# Patient Record
Sex: MALE | Race: WHITE | NOT HISPANIC OR LATINO | Employment: OTHER | URBAN - METROPOLITAN AREA
[De-identification: names, ages, dates, MRNs, and addresses within clinical notes are randomized per-mention and may not be internally consistent; named-entity substitution may affect disease eponyms.]

---

## 2018-02-21 PROBLEM — Z96.651 S/P TOTAL KNEE ARTHROPLASTY, RIGHT: Status: ACTIVE | Noted: 2018-02-21

## 2018-02-21 PROBLEM — M22.2X1 PATELLOFEMORAL SYNDROME OF RIGHT KNEE: Status: ACTIVE | Noted: 2018-02-21

## 2018-02-21 PROBLEM — T84.82XA ARTHROFIBROSIS OF TOTAL KNEE ARTHROPLASTY: Status: ACTIVE | Noted: 2018-02-21

## 2018-06-23 PROBLEM — E66.01 MORBID OBESITY: Status: ACTIVE | Noted: 2018-06-23

## 2018-06-23 PROBLEM — R55 SYNCOPAL EPISODES: Status: ACTIVE | Noted: 2018-06-23

## 2018-06-23 PROBLEM — N17.9 AKI (ACUTE KIDNEY INJURY): Status: ACTIVE | Noted: 2018-06-23

## 2018-06-23 PROBLEM — I10 ESSENTIAL HYPERTENSION: Status: ACTIVE | Noted: 2018-06-23

## 2018-06-23 PROBLEM — L03.115 CELLULITIS OF RIGHT LOWER EXTREMITY: Status: ACTIVE | Noted: 2018-06-23

## 2018-06-24 PROBLEM — L03.115 CELLULITIS OF KNEE, RIGHT: Status: ACTIVE | Noted: 2018-06-24

## 2018-06-24 PROBLEM — B35.3 TINEA PEDIS OF RIGHT FOOT: Status: ACTIVE | Noted: 2018-06-24

## 2018-06-25 PROBLEM — T84.59XA INFECTED PROSTHETIC KNEE JOINT, INITIAL ENCOUNTER: Status: ACTIVE | Noted: 2018-06-25

## 2018-06-25 PROBLEM — Z96.659 INFECTED PROSTHETIC KNEE JOINT, INITIAL ENCOUNTER: Status: ACTIVE | Noted: 2018-06-25

## 2018-06-28 PROBLEM — Z02.9 DISCHARGE PLANNING ISSUES: Status: ACTIVE | Noted: 2018-06-28

## 2018-06-28 PROBLEM — Z75.8 DISCHARGE PLANNING ISSUES: Status: ACTIVE | Noted: 2018-06-28

## 2018-07-05 PROBLEM — E87.8 ELECTROLYTE ABNORMALITY: Status: ACTIVE | Noted: 2018-07-05

## 2018-07-06 DIAGNOSIS — C41.9 MALIGNANT NEOPLASM OF BONE, UNSPECIFIED LOCATION: Primary | ICD-10-CM

## 2018-08-02 ENCOUNTER — TELEPHONE (OUTPATIENT)
Dept: HEMATOLOGY/ONCOLOGY | Facility: CLINIC | Age: 73
End: 2018-08-02

## 2018-08-02 NOTE — TELEPHONE ENCOUNTER
[facility] for patient to see Dr. Fonseca.  Available time was based on transportation via [facility].  Marley verbalized understanding.

## 2018-08-25 PROBLEM — N12 PYELONEPHRITIS: Status: ACTIVE | Noted: 2018-08-25

## 2018-08-25 PROBLEM — R41.0 DELIRIUM: Status: ACTIVE | Noted: 2018-08-25

## 2018-08-25 PROBLEM — D63.8 ANEMIA, CHRONIC DISEASE: Status: ACTIVE | Noted: 2018-08-25

## 2018-08-25 PROBLEM — E86.1 INTRAVASCULAR VOLUME DEPLETION: Status: ACTIVE | Noted: 2018-08-25

## 2018-08-26 PROBLEM — A41.9 SEPSIS: Status: ACTIVE | Noted: 2018-08-26

## 2019-01-05 PROBLEM — T84.53XA INFECTION OF TOTAL RIGHT KNEE REPLACEMENT: Status: ACTIVE | Noted: 2019-01-05

## 2019-09-06 ENCOUNTER — TELEPHONE (OUTPATIENT)
Dept: NEUROSURGERY | Facility: CLINIC | Age: 74
End: 2019-09-06

## 2019-09-25 ENCOUNTER — OFFICE VISIT (OUTPATIENT)
Dept: NEUROSURGERY | Facility: CLINIC | Age: 74
End: 2019-09-25
Payer: MEDICARE

## 2019-09-25 VITALS — SYSTOLIC BLOOD PRESSURE: 120 MMHG | DIASTOLIC BLOOD PRESSURE: 80 MMHG | HEART RATE: 64 BPM

## 2019-09-25 DIAGNOSIS — G89.4 CHRONIC PAIN DISORDER: Primary | ICD-10-CM

## 2019-09-25 PROCEDURE — 3079F PR MOST RECENT DIASTOLIC BLOOD PRESSURE 80-89 MM HG: ICD-10-PCS | Mod: CPTII,S$GLB,, | Performed by: PHYSICIAN ASSISTANT

## 2019-09-25 PROCEDURE — 99999 PR PBB SHADOW E&M-EST. PATIENT-LVL III: CPT | Mod: PBBFAC,,, | Performed by: PHYSICIAN ASSISTANT

## 2019-09-25 PROCEDURE — 1101F PR PT FALLS ASSESS DOC 0-1 FALLS W/OUT INJ PAST YR: ICD-10-PCS | Mod: CPTII,S$GLB,, | Performed by: PHYSICIAN ASSISTANT

## 2019-09-25 PROCEDURE — 99203 PR OFFICE/OUTPT VISIT, NEW, LEVL III, 30-44 MIN: ICD-10-PCS | Mod: S$GLB,,, | Performed by: PHYSICIAN ASSISTANT

## 2019-09-25 PROCEDURE — 3079F DIAST BP 80-89 MM HG: CPT | Mod: CPTII,S$GLB,, | Performed by: PHYSICIAN ASSISTANT

## 2019-09-25 PROCEDURE — 1101F PT FALLS ASSESS-DOCD LE1/YR: CPT | Mod: CPTII,S$GLB,, | Performed by: PHYSICIAN ASSISTANT

## 2019-09-25 PROCEDURE — 3074F SYST BP LT 130 MM HG: CPT | Mod: CPTII,S$GLB,, | Performed by: PHYSICIAN ASSISTANT

## 2019-09-25 PROCEDURE — 99203 OFFICE O/P NEW LOW 30 MIN: CPT | Mod: S$GLB,,, | Performed by: PHYSICIAN ASSISTANT

## 2019-09-25 PROCEDURE — 99999 PR PBB SHADOW E&M-EST. PATIENT-LVL III: ICD-10-PCS | Mod: PBBFAC,,, | Performed by: PHYSICIAN ASSISTANT

## 2019-09-25 PROCEDURE — 3074F PR MOST RECENT SYSTOLIC BLOOD PRESSURE < 130 MM HG: ICD-10-PCS | Mod: CPTII,S$GLB,, | Performed by: PHYSICIAN ASSISTANT

## 2019-09-25 NOTE — LETTER
October 8, 2019      Dillon Peacock MD  [id number] [facility] [address]  [facility] [address]           [place] [facility]  [address] [place] [address]  Phone: [phone]  Fax: [phone]          Patient: Edmundo Beyer   MR Number: [id number]   YOB: 1945   Date of Visit: 9/25/2019       Dear Dr. Dillon Peacock:    Thank you for referring Edmundo Beyer to me for evaluation. Attached you will find relevant portions of my assessment and plan of care.    If you have questions, please do not hesitate to call me. I look forward to following Edmundo Beyer along with you.    Sincerely,    Leila Isbell PA-C    Enclosure  CC:  No Recipients    If you would like to receive this communication electronically, please contact [email or web address] or [phone] to request more information on [employer] Link access.    For providers and/or their staff who would like to refer a patient to [facility], please contact us through our one-stop-shop provider referral line, [facility], at [phone].    If you feel you have received this communication in error or would no longer like to receive these types of communications, please e-mail [email or web address]

## 2019-09-25 NOTE — PROGRESS NOTES
[facility] - Neurosurgery  Clinic Consult     Consult Requested By: Dillon Peacock MD  PCP: Landon Shea MD    Chief Complaint:   Chief Complaint   Patient presents with    Lumbar Spine Pain (L-Spine)     patient reports right buttocks pain that radiates into the right leg; pain 7/10         Past Medical History:   Diagnosis Date    Arthritis     Cancer 2012    left leg amputated, bladder     Encounter for blood transfusion     Hypertension     Presence of urostomy     Sleep apnea with use of continuous positive airway pressure (CPAP)     Wheelchair dependent      Past Surgical History:   Procedure Laterality Date    BACK SURGERY      4 surgeries    CARDIOVERSION      x 2    CARPAL TUNNEL RELEASE Bilateral     COLON SURGERY      COLONOSCOPY  2014    COLOSTOMY      COLOSTOMY CLOSURE      JOINT REPLACEMENT Right 2007    TKA    JOINT REPLACEMENT Right 02/21/2018    Spacer exchange TKA/lateral release/Exc bony overgrowth patella    JOINT REPLACEMENT Right 01/05/2019    Stage 2 revision, all 3 components/Open lateral release.    LEG AMPUTATION AT HIP Left 09/20/2012    MEDIPORT REMOVAL Left 8/30/2018    Procedure: REMOVAL, CATHETER, CENTRAL VENOUS, TUNNELED, WITH PORT;  Surgeon: Stu Godinez MD;  Location: [facility] OR;  Service: General;  Laterality: Left;    NECK SURGERY      2 surgeries    PORTACATH PLACEMENT      REVISION OF KNEE ARTHROPLASTY Right 6/26/2018    Procedure: REVISION, ARTHROPLASTY, KNEE-stage one;  Surgeon: Dillon Peacock MD;  Location: [facility] OR;  Service: Orthopedics;  Laterality: Right;    REVISION OF KNEE ARTHROPLASTY Right 1/5/2019    Procedure: REVISION, ARTHROPLASTY, KNEE STAGE 2-OPEN LATERAL RELEASE, WITH REMOVAL OF IMPLANTS.;  Surgeon: Dillon Peacock MD;  Location: [facility] OR;  Service: Orthopedics;  Laterality: Right;    TOTAL KNEE ARTHROPLASTY Right      Family History   Adopted: Yes     Social History     Tobacco Use    Smoking status: Former Smoker      Packs/day: 1.00     Types: Cigarettes     Last attempt to quit: 2000     Years since quittin.7    Smokeless tobacco: Former User    Tobacco comment: quit 18 years ago    Substance Use Topics    Alcohol use: No     Frequency: Never    Drug use: No      Review of patient's allergies indicates:   Allergen Reactions    Hydromorphone Itching    Morphine Itching       Current Outpatient Medications:     amlodipine (NORVASC) 10 MG tablet, Take 10 mg by mouth once daily. Take am of surgery with a sip of water, Disp: , Rfl:     amoxicillin-clavulanate 500-125mg (AUGMENTIN) 500-125 mg Tab, Take 1 tablet by mouth 2 (two) times daily., Disp: , Rfl: 0    escitalopram oxalate (LEXAPRO) 10 MG tablet, Take 10 mg by mouth once daily. Take am of surgery with a sip of water, Disp: , Rfl:     fentaNYL (DURAGESIC) 50 mcg/hr, Place 1 patch onto the skin every 72 hours., Disp: 10 patch, Rfl: 0    ferrous gluconate (FERGON) 324 MG tablet, Take 1 tablet (324 mg total) by mouth daily with breakfast. (Patient taking differently: Take 324 mg by mouth daily with breakfast. Hold am of surgery), Disp: , Rfl:     furosemide (LASIX) 40 MG tablet, , Disp: , Rfl:     metoprolol tartrate (LOPRESSOR) 50 MG tablet, Take 50 mg by mouth 2 (two) times daily. Take am of surgery with a sip of water, Disp: , Rfl:     oxyCODONE-acetaminophen (PERCOCET)  mg per tablet, Take 1 tablet by mouth every 8 (eight) hours as needed for Pain., Disp: 90 tablet, Rfl: 0    oxyCODONE-acetaminophen (PERCOCET)  mg per tablet, Take 1 tablet by mouth every 8 (eight) hours as needed., Disp: 90 tablet, Rfl: 0    pantoprazole (PROTONIX) 40 MG tablet, Take 40 mg by mouth once daily. Take am of surgery with a sip of water, Disp: , Rfl:     rosuvastatin (CRESTOR) 10 MG tablet, , Disp: , Rfl:     Review of Systems:   Constitutional: no fever, chills or night sweats. No changes in weight   Eyes: no visual changes   ENT: no nasal congestion or sore  throat   Respiratory: no cough or shortness of breath   Cardiovascular: no chest pain or palpitations   Gastrointestinal: no nausea or vomiting   Genitourinary: no hematuria or dysuria   Musculoskeletal: +myalgias, back pain   Neurological: no seizures or tremors +numbness  Behavioral/Psych: no auditory or visual hallucinations     OBJECTIVE:     Vital Signs (Most Recent):  Vitals:    09/25/19 1434   BP: 120/80   Pulse: 64         Physical Exam:   General: well developed, well nourished, no distress.   Neurologic: Alert and oriented. Thought content appropriate. GCS 15.   Head: normocephalic, atraumatic  Eyes: EOMI  Neck: trachea midline, no JVD   Cardiovascular: no LE edema  Pulmonary: normal respirations, no signs of respiratory distress  Skin: Skin is warm, dry and intact.  Motor Strength: moves upper extremities spontaneously with good strength and tone. 5/5 right leg throughout. Left leg amputation   DTR right leg 0  Gait: deferred    SILT RLE       Provider Dictation:     Edmundo Beyer is a 74 y.o. male who presents for evaluation of low back pain and right leg pain. Patient reports history of chronic neck and back pain. He reports history of cervical fusion and lumbar decompression and fusion. He states he was diagnosed with bladder cancer approximately 7 years ago. He states the cancer spread into the left pelvis which required left hemipelvectomy and left lower extremity amputation. He reports part of his right leg was removed to reconstruct the left pelvis in order to allow him to sit. He states he is wheelchair bound. He is able to transfer himself from the wheelchair to his bed. He reports chronic low back pain with pain into bilateral buttocks. He reports intermittent right leg numbness. He complains of phantom limb pain. He has seen many pain management doctors in the past. He is interested in establishing care with a new pain management physician now.     No imaging available at time of appointment      On exam, patient is sitting in wheelchair. He is full strength in right leg. Sensation intact. Left leg amputation.     VAS 7/10  PHQ 17  Oswestry Disability Index 66    I have discussed obtaining new imaging of the lumbar spine for further work up. We have discussed referral to pain management to discuss conservative therapies for his pain. Patient states he is only interested in seeing pain management to discuss pain control. I have referred to pain management at the request of the patient.     Patient verbalized understanding of plan. Encouraged to call with any questions or concerns.     Chronic pain disorder  -     Ambulatory Referral to Pain Clinic

## 2019-09-30 ENCOUNTER — TELEPHONE (OUTPATIENT)
Dept: PAIN MEDICINE | Facility: CLINIC | Age: 74
End: 2019-09-30

## 2019-09-30 ENCOUNTER — OFFICE VISIT (OUTPATIENT)
Dept: PAIN MEDICINE | Facility: CLINIC | Age: 74
End: 2019-09-30
Payer: MEDICARE

## 2019-09-30 ENCOUNTER — HOSPITAL ENCOUNTER (OUTPATIENT)
Dept: RADIOLOGY | Facility: HOSPITAL | Age: 74
Discharge: HOME | End: 2019-09-30
Attending: ANESTHESIOLOGY
Payer: MEDICARE

## 2019-09-30 VITALS
HEART RATE: 68 BPM | OXYGEN SATURATION: 95 % | SYSTOLIC BLOOD PRESSURE: 137 MMHG | DIASTOLIC BLOOD PRESSURE: 63 MMHG | TEMPERATURE: 98 F | RESPIRATION RATE: 20 BRPM

## 2019-09-30 DIAGNOSIS — G89.4 CHRONIC PAIN SYNDROME: ICD-10-CM

## 2019-09-30 DIAGNOSIS — G54.6 PHANTOM LIMB PAIN: ICD-10-CM

## 2019-09-30 DIAGNOSIS — M54.16 RIGHT LUMBAR RADICULOPATHY: ICD-10-CM

## 2019-09-30 DIAGNOSIS — M96.1 POSTLAMINECTOMY SYNDROME OF LUMBAR REGION: ICD-10-CM

## 2019-09-30 DIAGNOSIS — M96.1 POSTLAMINECTOMY SYNDROME OF LUMBAR REGION: Primary | ICD-10-CM

## 2019-09-30 DIAGNOSIS — Z79.891 OPIOID USE AGREEMENT EXISTS: ICD-10-CM

## 2019-09-30 PROCEDURE — 99999 PR PBB SHADOW E&M-EST. PATIENT-LVL III: CPT | Mod: PBBFAC,,, | Performed by: ANESTHESIOLOGY

## 2019-09-30 PROCEDURE — 3075F PR MOST RECENT SYSTOLIC BLOOD PRESS GE 130-139MM HG: ICD-10-PCS | Mod: CPTII,S$GLB,, | Performed by: ANESTHESIOLOGY

## 2019-09-30 PROCEDURE — 3075F SYST BP GE 130 - 139MM HG: CPT | Mod: CPTII,S$GLB,, | Performed by: ANESTHESIOLOGY

## 2019-09-30 PROCEDURE — 99204 OFFICE O/P NEW MOD 45 MIN: CPT | Mod: S$GLB,,, | Performed by: ANESTHESIOLOGY

## 2019-09-30 PROCEDURE — 72080 XR THORACOLUMBAR SPINE AP LATERAL: ICD-10-PCS | Mod: 26,,, | Performed by: RADIOLOGY

## 2019-09-30 PROCEDURE — 1101F PR PT FALLS ASSESS DOC 0-1 FALLS W/OUT INJ PAST YR: ICD-10-PCS | Mod: CPTII,S$GLB,, | Performed by: ANESTHESIOLOGY

## 2019-09-30 PROCEDURE — 1101F PT FALLS ASSESS-DOCD LE1/YR: CPT | Mod: CPTII,S$GLB,, | Performed by: ANESTHESIOLOGY

## 2019-09-30 PROCEDURE — 3078F PR MOST RECENT DIASTOLIC BLOOD PRESSURE < 80 MM HG: ICD-10-PCS | Mod: CPTII,S$GLB,, | Performed by: ANESTHESIOLOGY

## 2019-09-30 PROCEDURE — 72080 X-RAY EXAM THORACOLMB 2/> VW: CPT | Mod: TC,PO

## 2019-09-30 PROCEDURE — 99999 PR PBB SHADOW E&M-EST. PATIENT-LVL III: ICD-10-PCS | Mod: PBBFAC,,, | Performed by: ANESTHESIOLOGY

## 2019-09-30 PROCEDURE — 99204 PR OFFICE/OUTPT VISIT, NEW, LEVL IV, 45-59 MIN: ICD-10-PCS | Mod: S$GLB,,, | Performed by: ANESTHESIOLOGY

## 2019-09-30 PROCEDURE — 80307 DRUG TEST PRSMV CHEM ANLYZR: CPT

## 2019-09-30 PROCEDURE — 72080 X-RAY EXAM THORACOLMB 2/> VW: CPT | Mod: 26,,, | Performed by: RADIOLOGY

## 2019-09-30 PROCEDURE — 3078F DIAST BP <80 MM HG: CPT | Mod: CPTII,S$GLB,, | Performed by: ANESTHESIOLOGY

## 2019-09-30 RX ORDER — PREGABALIN 50 MG/1
50 CAPSULE ORAL 2 TIMES DAILY
Qty: 60 CAPSULE | Refills: 2 | Status: SHIPPED | OUTPATIENT
Start: 2019-09-30 | End: 2019-10-28

## 2019-09-30 RX ORDER — FENTANYL 50 UG/1
1 PATCH TRANSDERMAL
Qty: 10 PATCH | Refills: 0 | Status: SHIPPED | OUTPATIENT
Start: 2019-09-30 | End: 2019-10-28 | Stop reason: SDUPTHER

## 2019-09-30 RX ORDER — OXYCODONE AND ACETAMINOPHEN 10; 325 MG/1; MG/1
1 TABLET ORAL EVERY 8 HOURS PRN
Qty: 90 TABLET | Refills: 0 | Status: SHIPPED | OUTPATIENT
Start: 2019-09-30 | End: 2019-10-28 | Stop reason: SDUPTHER

## 2019-09-30 NOTE — TELEPHONE ENCOUNTER
There is a block on your license and the pharmacy is not able to fill medications. They recommended you contact the [facility] to get cleared. C2 medications are the only medications that can be filled at this time.

## 2019-09-30 NOTE — LETTER
October 6, 2019      Leila Isbell PA-C  [address]  [address]  [place] [address]           [place] - [facility]  [address] [place] [address]  Phone: [phone]  Fax: [phone]          Patient: Edmundo Beyer   MR Number: [id number]   YOB: 1945   Date of Visit: 9/30/2019       Dear Leila Isbell:    Thank you for referring Edmundo Beyer to me for evaluation. Attached you will find relevant portions of my assessment and plan of care.    If you have questions, please do not hesitate to call me. I look forward to following Edmundo Beyer along with you.    Sincerely,    Felipe Renae MD    Enclosure  CC:  No Recipients    If you would like to receive this communication electronically, please contact [email or web address] or [phone] to request more information on [employer] Link access.    For providers and/or their staff who would like to refer a patient to [facility], please contact us through our one-stop-shop provider referral line, [facility] , at [phone].    If you feel you have received this communication in error or would no longer like to receive these types of communications, please e-mail [email or web address]

## 2019-09-30 NOTE — PROGRESS NOTES
This note was completed with dictation software and grammatical errors may exist.    CC:  Back pain, right leg pain, neck pain    HPI:  The patient is a 74-year-old man with a history of bladder CA status post cystectomy also requiring left leg amputation, chronic low back pain with multiple lumbar surgeries who presents in referral from [facility] for continued pain. The patient has had a total of 4 back surgeries with fusions, laminectomy is, continued right leg radicular pain throughout the entire extremity and foot.  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  In the past year he had a revision of his right knee replacement but had an infection and required a spacer being placed, long-term antibiotics.  He has been recovering from this and has consulted with Neurosurgery about his back and leg pain and they do not feel that he is a surgical candidate at this point.  His pain is located across the bilateral buttocks and throughout the entire right leg.  He describes it as aching, burning, grabbing, sharp and electric.  It is worse with prolonged sitting, standing, lying down or getting out of a bed or a chair.  He does get some relief with medications. He has been taking Percocet 10/325 up to 3 times a day with about 75% relief in addition to fentanyl 50 mcg/hour.  He denies any new fevers or chills, no new weakness.    Pain intervention history:  He had tried multiple injections in the past, the last time was over 10 years ago.  He has tried gabapentin in the past but had side effects.  He has not tried Lyrica or Topamax(has a history of renal calculi).He has been taking Percocet 10/325 up to 3 times a day with about 75% relief in addition to fentanyl 50 mcg/hour.    Lab Results   Component Value Date    HGBA1C 5.5 01/03/2019         ROS:  He reports fatigability, weight gain, itching, shortness of breath, constipation, easy bruising, bleeding, history of kidney  stones, joint stiffness, joint swelling, back pain, difficulty sleeping, depression and loss of balance.  Balance of review of systems is negative.    Past Medical History:   Diagnosis Date    Arthritis     Cancer 2012    left leg amputated, bladder     Encounter for blood transfusion     Hypertension     Presence of urostomy     Sleep apnea with use of continuous positive airway pressure (CPAP)     Wheelchair dependent        Past Surgical History:   Procedure Laterality Date    BACK SURGERY      4 surgeries    CARDIOVERSION      x 2    CARPAL TUNNEL RELEASE Bilateral     COLON SURGERY      COLONOSCOPY  2014    COLOSTOMY      COLOSTOMY CLOSURE      JOINT REPLACEMENT Right 2007    TKA    JOINT REPLACEMENT Right 02/21/2018    Spacer exchange TKA/lateral release/Exc bony overgrowth patella    JOINT REPLACEMENT Right 01/05/2019    Stage 2 revision, all 3 components/Open lateral release.    LEG AMPUTATION AT HIP Left 09/20/2012    MEDIPORT REMOVAL Left 8/30/2018    Procedure: REMOVAL, CATHETER, CENTRAL VENOUS, TUNNELED, WITH PORT;  Surgeon: Stu Godinez MD;  Location: [facility] OR;  Service: General;  Laterality: Left;    NECK SURGERY      2 surgeries    PORTACATH PLACEMENT      REVISION OF KNEE ARTHROPLASTY Right 6/26/2018    Procedure: REVISION, ARTHROPLASTY, KNEE-stage one;  Surgeon: Dillon Peacock MD;  Location: [facility] OR;  Service: Orthopedics;  Laterality: Right;    REVISION OF KNEE ARTHROPLASTY Right 1/5/2019    Procedure: REVISION, ARTHROPLASTY, KNEE STAGE 2-OPEN LATERAL RELEASE, WITH REMOVAL OF IMPLANTS.;  Surgeon: Dillon Peacock MD;  Location: [facility] OR;  Service: Orthopedics;  Laterality: Right;    TOTAL KNEE ARTHROPLASTY Right        Social History     Socioeconomic History    Marital status:      Spouse name: Not on file    Number of children: Not on file    Years of education: Not on file    Highest education level: Not on file   Occupational History    Not on file   Social  Needs    Financial resource strain: Not on file    Food insecurity:     Worry: Not on file     Inability: Not on file    Transportation needs:     Medical: Not on file     Non-medical: Not on file   Tobacco Use    Smoking status: Former Smoker     Packs/day: 1.00     Types: Cigarettes     Last attempt to quit: 2000     Years since quittin.7    Smokeless tobacco: Former User    Tobacco comment: quit 18 years ago    Substance and Sexual Activity    Alcohol use: No     Frequency: Never    Drug use: No    Sexual activity: Never   Lifestyle    Physical activity:     Days per week: Not on file     Minutes per session: Not on file    Stress: Not on file   Relationships    Social connections:     Talks on phone: Not on file     Gets together: Not on file     Attends Baptism service: Not on file     Active member of club or organization: Not on file     Attends meetings of clubs or organizations: Not on file     Relationship status: Not on file   Other Topics Concern    Not on file   Social History Narrative    Not on file         Medications/Allergies: See med card    Vitals:    19 0816   BP: 137/63   Pulse: 68   Resp: 20   Temp: 97.8 °F (36.6 °C)   TempSrc: Oral   SpO2: 95%   PainSc:   6   PainLoc: Back         Physical exam:  Gen: A and O x3, pleasant, well-groomed  Skin: No rashes or obvious lesions  HEENT: PERRLA, no obvious deformities on ears or in canals. Trachea midline.  CVS: Regular rate and rhythm, normal palpable pulses.  Resp:No increased work of breathing, symmetrical chest rise.  Abdomen: Soft, NT/ND.  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    Neuro:  Lower extremities: 5/5 strength right side  Reflexes: Patellar 0+, Achilles 0+ right side  Sensory:  Right leg: Intact and symmetrical to light touch and pinprick in L2-S1 dermatomes with some decreased sensation to light touch and pinprick throughout the entire leg.    Lumbar spine:  Lumbar spine:   Range of motion not tested, unable to stand without assistance.  Ch[place]'s test causes no increased pain on either side.    Supine straight leg raise is negative on the right side.  Internal and external rotation of the hip causes no increased pain on either side.  Myofascial exam: No tenderness to palpation across lumbar paraspinous muscles.    Imaging:  None    Assessment:   The patient is a 74-year-old man with a history of bladder CA status post cystectomy also requiring left leg amputation, chronic low back pain with multiple lumbar surgeries who presents in referral from [facility] for continued pain.    1. Postlaminectomy syndrome of lumbar region  Pain Clinic Drug Screen    X-Ray Thoracolumbar Spine AP Lateral   2. Right lumbar radiculopathy  Pain Clinic Drug Screen    X-Ray Thoracolumbar Spine AP Lateral   3. Phantom limb pain  Pain Clinic Drug Screen    X-Ray Thoracolumbar Spine AP Lateral   4. Chronic pain syndrome  Pain Clinic Drug Screen    X-Ray Thoracolumbar Spine AP Lateral   5. Opioid use agreement exists  Pain Clinic Drug Screen       Plan:  1.  We discussed that he is not a great candidate for any further surgeries, may not even be a great candidate for any other procedural interventions.  At this point his pain is moderately controlled with fentanyl patch at 50 mcg/hour in addition to Percocet 3 times a day and I think that would be reasonable.  It does not appear that he has had any early refills or behavior that would suggest abuse of his medications. I have agreed to take over this medication for him, I have given him a refill of the fentanyl patch at 50 mcg/hour in addition to Percocet 10/325.  I am going to have him sign an opioid agreement and complete a urine drug screen.  2.  We are going to get imaging of his lumbar spine in the form of x-rays, we can discuss further management in follow-up visit.  3.  In the meantime I am going to give him prescription for Lyrica 50 mg  to start nightly and then increase to twice daily after week to see if this helps with his right leg radicular pain.    Thank you for referring this interesting patient, and I look forward to continuing to collaborate in his care.

## 2019-10-03 LAB
6MAM UR QL: NOT DETECTED
7AMINOCLONAZEPAM UR QL: NOT DETECTED
A-OH ALPRAZ UR QL: NOT DETECTED
ALPRAZ UR QL: NOT DETECTED
AMPHET UR QL SCN: NOT DETECTED
BARBITURATES UR QL: NOT DETECTED
BUPRENORPHINE UR QL: NOT DETECTED
BZE UR QL: NOT DETECTED
CARBOXYTHC UR QL: PRESENT
CARISOPRODOL UR QL: NOT DETECTED
CLONAZEPAM UR QL: NOT DETECTED
CODEINE UR QL: NOT DETECTED
CREAT UR-MCNC: 68.4 MG/DL (ref 20–400)
DIAZEPAM UR QL: NOT DETECTED
ETHYL GLUCURONIDE UR QL: NOT DETECTED
FENTANYL UR QL: PRESENT
HYDROCODONE UR QL: NOT DETECTED
HYDROMORPHONE UR QL: NOT DETECTED
LORAZEPAM UR QL: NOT DETECTED
MDA UR QL: NOT DETECTED
MDEA UR QL: NOT DETECTED
MDMA UR QL: NOT DETECTED
ME-PHENIDATE UR QL: NOT DETECTED
MEPERIDINE UR QL: NOT DETECTED
METHADONE UR QL: NOT DETECTED
METHAMPHET UR QL: NOT DETECTED
MIDAZOLAM UR QL SCN: NOT DETECTED
MORPHINE UR QL: NOT DETECTED
NORBUPRENORPHINE UR QL CFM: NOT DETECTED
NORDIAZEPAM UR QL: NOT DETECTED
NORFENTANYL UR QL: PRESENT
NORHYDROCODONE UR QL CFM: NOT DETECTED
NOROXYCODONE UR QL CFM: PRESENT
NOROXYMORPHONE: NOT DETECTED
OXAZEPAM UR QL: NOT DETECTED
OXYCODONE UR QL: NOT DETECTED
OXYMORPHONE UR QL: NOT DETECTED
PATHOLOGY STUDY: NORMAL
PCP UR QL: NOT DETECTED
PHENTERMINE UR QL: NOT DETECTED
PROPOXYPH UR QL: NOT DETECTED
SERVICE CMNT-IMP: NORMAL
TAPENTADOL UR QL SCN: NOT DETECTED
TAPENTADOL-O-SULF: NOT DETECTED
TEMAZEPAM UR QL: NOT DETECTED
TRAMADOL UR QL: NOT DETECTED
ZOLPIDEM UR QL: NOT DETECTED

## 2019-10-08 ENCOUNTER — TELEPHONE (OUTPATIENT)
Dept: PAIN MEDICINE | Facility: CLINIC | Age: 74
End: 2019-10-08

## 2019-10-08 NOTE — TELEPHONE ENCOUNTER
Urine drug screen on 09/30/2019 positive for oxycodone metabolite, positive for Fentanyl and metabolite both of these were expected.  However it was also positive for marijuana metabolites.

## 2019-10-09 NOTE — TELEPHONE ENCOUNTER
Spoke with the patient and he is aware of the results. He is also aware that if he test positive again he may be dismissed from the practice as this goes against his contract with Dr. Rneae.

## 2019-10-28 ENCOUNTER — OFFICE VISIT (OUTPATIENT)
Dept: PAIN MEDICINE | Facility: CLINIC | Age: 74
End: 2019-10-28
Payer: MEDICARE

## 2019-10-28 VITALS
OXYGEN SATURATION: 95 % | HEART RATE: 69 BPM | TEMPERATURE: 97 F | RESPIRATION RATE: 20 BRPM | SYSTOLIC BLOOD PRESSURE: 161 MMHG | DIASTOLIC BLOOD PRESSURE: 73 MMHG

## 2019-10-28 DIAGNOSIS — N20.0 RENAL CALCULUS: ICD-10-CM

## 2019-10-28 DIAGNOSIS — G89.4 CHRONIC PAIN SYNDROME: ICD-10-CM

## 2019-10-28 DIAGNOSIS — G54.6 PHANTOM LIMB PAIN: ICD-10-CM

## 2019-10-28 DIAGNOSIS — M54.16 RIGHT LUMBAR RADICULOPATHY: ICD-10-CM

## 2019-10-28 DIAGNOSIS — M96.1 POSTLAMINECTOMY SYNDROME OF LUMBAR REGION: Primary | ICD-10-CM

## 2019-10-28 DIAGNOSIS — Z79.891 OPIOID USE AGREEMENT EXISTS: ICD-10-CM

## 2019-10-28 PROCEDURE — 3078F DIAST BP <80 MM HG: CPT | Mod: CPTII,S$GLB,, | Performed by: ANESTHESIOLOGY

## 2019-10-28 PROCEDURE — 3077F SYST BP >= 140 MM HG: CPT | Mod: CPTII,S$GLB,, | Performed by: ANESTHESIOLOGY

## 2019-10-28 PROCEDURE — 99999 PR PBB SHADOW E&M-EST. PATIENT-LVL III: ICD-10-PCS | Mod: PBBFAC,,, | Performed by: ANESTHESIOLOGY

## 2019-10-28 PROCEDURE — 99213 PR OFFICE/OUTPT VISIT, EST, LEVL III, 20-29 MIN: ICD-10-PCS | Mod: S$GLB,,, | Performed by: ANESTHESIOLOGY

## 2019-10-28 PROCEDURE — 99213 OFFICE O/P EST LOW 20 MIN: CPT | Mod: S$GLB,,, | Performed by: ANESTHESIOLOGY

## 2019-10-28 PROCEDURE — 3077F PR MOST RECENT SYSTOLIC BLOOD PRESSURE >= 140 MM HG: ICD-10-PCS | Mod: CPTII,S$GLB,, | Performed by: ANESTHESIOLOGY

## 2019-10-28 PROCEDURE — 3078F PR MOST RECENT DIASTOLIC BLOOD PRESSURE < 80 MM HG: ICD-10-PCS | Mod: CPTII,S$GLB,, | Performed by: ANESTHESIOLOGY

## 2019-10-28 PROCEDURE — 99999 PR PBB SHADOW E&M-EST. PATIENT-LVL III: CPT | Mod: PBBFAC,,, | Performed by: ANESTHESIOLOGY

## 2019-10-28 PROCEDURE — 1101F PT FALLS ASSESS-DOCD LE1/YR: CPT | Mod: CPTII,S$GLB,, | Performed by: ANESTHESIOLOGY

## 2019-10-28 PROCEDURE — 1101F PR PT FALLS ASSESS DOC 0-1 FALLS W/OUT INJ PAST YR: ICD-10-PCS | Mod: CPTII,S$GLB,, | Performed by: ANESTHESIOLOGY

## 2019-10-28 RX ORDER — FENTANYL 50 UG/1
1 PATCH TRANSDERMAL
Qty: 10 PATCH | Refills: 0 | Status: SHIPPED | OUTPATIENT
Start: 2019-11-29 | End: 2019-12-30 | Stop reason: SDUPTHER

## 2019-10-28 RX ORDER — OXYCODONE AND ACETAMINOPHEN 10; 325 MG/1; MG/1
1 TABLET ORAL EVERY 8 HOURS PRN
Qty: 90 TABLET | Refills: 0 | Status: SHIPPED | OUTPATIENT
Start: 2019-11-29 | End: 2019-12-30 | Stop reason: SDUPTHER

## 2019-10-28 RX ORDER — OXYCODONE AND ACETAMINOPHEN 10; 325 MG/1; MG/1
1 TABLET ORAL EVERY 8 HOURS PRN
Qty: 90 TABLET | Refills: 0 | Status: SHIPPED | OUTPATIENT
Start: 2019-10-30 | End: 2019-11-29

## 2019-10-28 RX ORDER — FENTANYL 50 UG/1
1 PATCH TRANSDERMAL
Qty: 10 PATCH | Refills: 0 | Status: SHIPPED | OUTPATIENT
Start: 2019-10-30 | End: 2019-11-29

## 2019-10-28 NOTE — PROGRESS NOTES
This note was completed with dictation software and grammatical errors may exist.    CC:  Back pain, right leg pain, neck pain    HPI:  The patient is a 74-year-old man with a history of bladder CA status post cystectomy also requiring left leg amputation, chronic low back pain with multiple lumbar surgeries who presents in referral from [facility] for continued pain. The patient returns in follow-up today, during the last visit I had agreed to take over writing his prescriptions.  He has been using fentanyl 50 mcg/hour and Percocet 10/325 up to 3 times a day.  He states that this regimen has done well to control his pain, denies any side effects with this.  Since I had last seen him, we had done a urine drug screen and it tested positive for THC.  He states that he used a CBD oil for 10 days that his friend had recommended and he states that it did not help so he will no longer be using this.  He had tried using the Lyrica 50 mg nightly for a week and states that he has had jerking of his extremities which is the same thing that happened with gabapentin but not as severe.    Also since we had last seen him, he developed a renal calculus.  He states that he has seen Urology and they were not able to do the lithotripsy treatment because the stone was so large and also because he has had a nonfunctioning bladder and surgical changes to his ureter anatomy.  He states that they are going to wait until it becomes painful and do a procedure through his flank.  At this point he does not feel any symptoms of the kidney stone however.      Previous history:  The patient has had a total of 4 back surgeries with fusions, laminectomy is, continued right leg radicular pain throughout the entire extremity and foot.  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  In the past year he had a revision of his right knee replacement but had an infection and required a spacer being placed,  long-term antibiotics.  He has been recovering from this and has consulted with Neurosurgery about his back and leg pain and they do not feel that he is a surgical candidate at this point.  His pain is located across the bilateral buttocks and throughout the entire right leg.  He describes it as aching, burning, grabbing, sharp and electric.  It is worse with prolonged sitting, standing, lying down or getting out of a bed or a chair.  He does get some relief with medications. He has been taking Percocet 10/325 up to 3 times a day with about 75% relief in addition to fentanyl 50 mcg/hour.  He denies any new fevers or chills, no new weakness.    Pain intervention history:  He had tried multiple injections in the past, the last time was over 10 years ago.  He has tried gabapentin in the past but had side effects.  He has not tried Lyrica or Topamax(has a history of renal calculi).He has been taking Percocet 10/325 up to 3 times a day with about 75% relief in addition to fentanyl 50 mcg/hour.    Lab Results   Component Value Date    HGBA1C 5.5 01/03/2019         ROS:  He reports fatigability, weight gain, itching, shortness of breath, constipation, easy bruising, bleeding, history of kidney stones, joint stiffness, joint swelling, back pain, difficulty sleeping, depression and loss of balance.  Balance of review of systems is negative.    Past Medical History:   Diagnosis Date    Arthritis     Cancer 2012    left leg amputated, bladder     Encounter for blood transfusion     Hypertension     Presence of urostomy     Sleep apnea with use of continuous positive airway pressure (CPAP)     Wheelchair dependent        Past Surgical History:   Procedure Laterality Date    BACK SURGERY      4 surgeries    CARDIOVERSION      x 2    CARPAL TUNNEL RELEASE Bilateral     COLON SURGERY      COLONOSCOPY  2014    COLOSTOMY      COLOSTOMY CLOSURE      JOINT REPLACEMENT Right 2007    TKA    JOINT REPLACEMENT Right  2018    Spacer exchange TKA/lateral release/Exc bony overgrowth patella    JOINT REPLACEMENT Right 2019    Stage 2 revision, all 3 components/Open lateral release.    LEG AMPUTATION AT HIP Left 2012    MEDIPORT REMOVAL Left 2018    Procedure: REMOVAL, CATHETER, CENTRAL VENOUS, TUNNELED, WITH PORT;  Surgeon: Stu Godinez MD;  Location: [facility];  Service: General;  Laterality: Left;    NECK SURGERY      2 surgeries    PORTACATH PLACEMENT      REVISION OF KNEE ARTHROPLASTY Right 2018    Procedure: REVISION, ARTHROPLASTY, KNEE-stage one;  Surgeon: Dillon Peacock MD;  Location: [facility];  Service: Orthopedics;  Laterality: Right;    REVISION OF KNEE ARTHROPLASTY Right 2019    Procedure: REVISION, ARTHROPLASTY, KNEE STAGE 2-OPEN LATERAL RELEASE, WITH REMOVAL OF IMPLANTS.;  Surgeon: Dillon Peacock MD;  Location: [facility];  Service: Orthopedics;  Laterality: Right;    TOTAL KNEE ARTHROPLASTY Right        Social History     Socioeconomic History    Marital status:      Spouse name: Not on file    Number of children: Not on file    Years of education: Not on file    Highest education level: Not on file   Occupational History    Not on file   Social Needs    Financial resource strain: Not on file    Food insecurity:     Worry: Not on file     Inability: Not on file    Transportation needs:     Medical: Not on file     Non-medical: Not on file   Tobacco Use    Smoking status: Former Smoker     Packs/day: 1.00     Types: Cigarettes     Last attempt to quit: 2000     Years since quittin.8    Smokeless tobacco: Former User    Tobacco comment: quit 18 years ago    Substance and Sexual Activity    Alcohol use: No     Frequency: Never    Drug use: No    Sexual activity: Never   Lifestyle    Physical activity:     Days per week: Not on file     Minutes per session: Not on file    Stress: Not on file   Relationships    Social connections:     Talks on phone: Not  on file     Gets together: Not on file     Attends [employer] service: Not on file     Active member of club or organization: Not on file     Attends meetings of clubs or organizations: Not on file     Relationship status: Not on file   Other Topics Concern    Not on file   Social History Narrative    Not on file         Medications/Allergies: See med card    Vitals:    10/28/19 1106   BP: (!) 161/73   Pulse: 69   Resp: 20   Temp: 97.1 °F (36.2 °C)   TempSrc: Oral   SpO2: 95%   PainSc:   4   PainLoc: Back         Physical exam:  Gen: A and O x3, pleasant, well-groomed  Skin: No rashes or obvious lesions  HEENT: PERRLA, no obvious deformities on ears or in canals. Trachea midline.  CVS: Regular rate and rhythm, normal palpable pulses.  Resp:No increased work of breathing, symmetrical chest rise.  Abdomen: Soft, NT/ND.  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    Neuro:  Lower extremities: 5/5 strength right side  Reflexes: Patellar 0+, Achilles 0+ right side  Sensory:  Right leg: Intact and symmetrical to light touch and pinprick in L2-S1 dermatomes with some decreased sensation to light touch and pinprick throughout the entire leg.    Lumbar spine:  Lumbar spine:  Range of motion not tested, unable to stand without assistance.  Chico's test causes no increased pain on either side.    Supine straight leg raise is negative on the right side.  Internal and external rotation of the hip causes no increased pain on either side.  Myofascial exam: No tenderness to palpation across lumbar paraspinous muscles.    Imaging:  None    Assessment:   The patient is a 74-year-old man with a history of bladder CA status post cystectomy also requiring left leg amputation, chronic low back pain with multiple lumbar surgeries who presents in referral from [facility] for continued pain.    1. Postlaminectomy syndrome of lumbar region     2. Right lumbar radiculopathy     3. Phantom  limb pain     4. Chronic pain syndrome     5. Opioid use agreement exists     6. Renal calculus         Plan:  1.  I explained that since he was having side effects with the Lyrica as same as he was with the gabapentin, we will discontinue this because it does not seem to be helping and we would need to increase the dose but I am afraid of further side effects.  He is going to stop this.  2.  He does feel improvement in his pain, quality of life with fentanyl 50 mcg/hour and Percocet 10/325 3 times a day.  I have refilled this for the next 2 months.  We will collect a urine drug screen during the next visit.  3.  I will have him contact me if he has any worsening of pain or other issues.  Otherwise we will see him in 2 months or sooner as needed.

## 2019-12-30 ENCOUNTER — OFFICE VISIT (OUTPATIENT)
Dept: PAIN MEDICINE | Facility: CLINIC | Age: 74
End: 2019-12-30
Payer: MEDICARE

## 2019-12-30 VITALS
DIASTOLIC BLOOD PRESSURE: 57 MMHG | RESPIRATION RATE: 18 BRPM | TEMPERATURE: 98 F | SYSTOLIC BLOOD PRESSURE: 115 MMHG | OXYGEN SATURATION: 95 % | HEART RATE: 84 BPM

## 2019-12-30 DIAGNOSIS — M96.1 POSTLAMINECTOMY SYNDROME OF LUMBAR REGION: ICD-10-CM

## 2019-12-30 DIAGNOSIS — G89.4 CHRONIC PAIN SYNDROME: Primary | ICD-10-CM

## 2019-12-30 DIAGNOSIS — N20.0 RENAL CALCULUS: ICD-10-CM

## 2019-12-30 DIAGNOSIS — Z79.891 OPIOID USE AGREEMENT EXISTS: ICD-10-CM

## 2019-12-30 DIAGNOSIS — G54.6 PHANTOM LIMB PAIN: ICD-10-CM

## 2019-12-30 DIAGNOSIS — M54.16 RIGHT LUMBAR RADICULOPATHY: ICD-10-CM

## 2019-12-30 PROCEDURE — 99999 PR PBB SHADOW E&M-EST. PATIENT-LVL III: CPT | Mod: PBBFAC,,, | Performed by: ANESTHESIOLOGY

## 2019-12-30 PROCEDURE — 1101F PR PT FALLS ASSESS DOC 0-1 FALLS W/OUT INJ PAST YR: ICD-10-PCS | Mod: CPTII,S$GLB,, | Performed by: ANESTHESIOLOGY

## 2019-12-30 PROCEDURE — 1125F PR PAIN SEVERITY QUANTIFIED, PAIN PRESENT: ICD-10-PCS | Mod: S$GLB,,, | Performed by: ANESTHESIOLOGY

## 2019-12-30 PROCEDURE — 99213 PR OFFICE/OUTPT VISIT, EST, LEVL III, 20-29 MIN: ICD-10-PCS | Mod: S$GLB,,, | Performed by: ANESTHESIOLOGY

## 2019-12-30 PROCEDURE — 99999 PR PBB SHADOW E&M-EST. PATIENT-LVL III: ICD-10-PCS | Mod: PBBFAC,,, | Performed by: ANESTHESIOLOGY

## 2019-12-30 PROCEDURE — 1125F AMNT PAIN NOTED PAIN PRSNT: CPT | Mod: S$GLB,,, | Performed by: ANESTHESIOLOGY

## 2019-12-30 PROCEDURE — 3078F PR MOST RECENT DIASTOLIC BLOOD PRESSURE < 80 MM HG: ICD-10-PCS | Mod: CPTII,S$GLB,, | Performed by: ANESTHESIOLOGY

## 2019-12-30 PROCEDURE — 3074F PR MOST RECENT SYSTOLIC BLOOD PRESSURE < 130 MM HG: ICD-10-PCS | Mod: CPTII,S$GLB,, | Performed by: ANESTHESIOLOGY

## 2019-12-30 PROCEDURE — 80307 DRUG TEST PRSMV CHEM ANLYZR: CPT

## 2019-12-30 PROCEDURE — 3074F SYST BP LT 130 MM HG: CPT | Mod: CPTII,S$GLB,, | Performed by: ANESTHESIOLOGY

## 2019-12-30 PROCEDURE — 1101F PT FALLS ASSESS-DOCD LE1/YR: CPT | Mod: CPTII,S$GLB,, | Performed by: ANESTHESIOLOGY

## 2019-12-30 PROCEDURE — 99213 OFFICE O/P EST LOW 20 MIN: CPT | Mod: S$GLB,,, | Performed by: ANESTHESIOLOGY

## 2019-12-30 PROCEDURE — 1159F PR MEDICATION LIST DOCUMENTED IN MEDICAL RECORD: ICD-10-PCS | Mod: S$GLB,,, | Performed by: ANESTHESIOLOGY

## 2019-12-30 PROCEDURE — 3078F DIAST BP <80 MM HG: CPT | Mod: CPTII,S$GLB,, | Performed by: ANESTHESIOLOGY

## 2019-12-30 PROCEDURE — 1159F MED LIST DOCD IN RCRD: CPT | Mod: S$GLB,,, | Performed by: ANESTHESIOLOGY

## 2019-12-30 RX ORDER — OXYCODONE AND ACETAMINOPHEN 10; 325 MG/1; MG/1
1 TABLET ORAL EVERY 8 HOURS PRN
Qty: 90 TABLET | Refills: 0 | Status: SHIPPED | OUTPATIENT
Start: 2019-12-30 | End: 2020-01-29

## 2019-12-30 RX ORDER — FENTANYL 50 UG/1
1 PATCH TRANSDERMAL
Qty: 10 PATCH | Refills: 0 | Status: SHIPPED | OUTPATIENT
Start: 2020-01-29 | End: 2020-02-28 | Stop reason: SDUPTHER

## 2019-12-30 RX ORDER — AMITRIPTYLINE HYDROCHLORIDE 25 MG/1
25 TABLET, FILM COATED ORAL NIGHTLY
Qty: 30 TABLET | Refills: 2 | Status: SHIPPED | OUTPATIENT
Start: 2019-12-30 | End: 2022-05-03

## 2019-12-30 RX ORDER — OXYCODONE AND ACETAMINOPHEN 10; 325 MG/1; MG/1
1 TABLET ORAL EVERY 8 HOURS PRN
Qty: 90 TABLET | Refills: 0 | Status: SHIPPED | OUTPATIENT
Start: 2020-01-29 | End: 2020-02-28 | Stop reason: SDUPTHER

## 2019-12-30 RX ORDER — FENTANYL 50 UG/1
1 PATCH TRANSDERMAL
Qty: 10 PATCH | Refills: 0 | Status: SHIPPED | OUTPATIENT
Start: 2019-12-30 | End: 2020-01-29

## 2019-12-30 NOTE — PROGRESS NOTES
This note was completed with dictation software and grammatical errors may exist.    CC:  Back pain, right leg pain, neck pain    HPI:  The patient is a 74-year-old man with a history of bladder CA status post cystectomy also requiring left leg amputation, chronic low back pain with multiple lumbar surgeries who presents in referral from [facility] for continued pain. Returns in follow-up today for back pain, bilateral buttock pain radiating into the right leg and also phantom leg pain on the left side.  The phantom leg pain is constant but has severe shocks that occur several times weekly.  He feels that this is some the worst pain that he actually has.  Otherwise she denies any major changes, he reports that he has a large renal calculus but they are going to treat conservatively unless it becomes symptomatic.  He reports relief with his pain medication Percocet up to 3 times a day an additional the fentanyl that he is using.  His other complaint is poor sleep, he states that he has had on some knee up for a long time. He had asked about melatonin.    Previous history:  The patient has had a total of 4 back surgeries with fusions, laminectomy is, continued right leg radicular pain throughout the entire extremity and foot.  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  In the past year he had a revision of his right knee replacement but had an infection and required a spacer being placed, long-term antibiotics.  He has been recovering from this and has consulted with Neurosurgery about his back and leg pain and they do not feel that he is a surgical candidate at this point.  His pain is located across the bilateral buttocks and throughout the entire right leg.  He describes it as aching, burning, grabbing, sharp and electric.  It is worse with prolonged sitting, standing, lying down or getting out of a bed or a chair.  He does get some relief with medications. He has  been taking Percocet 10/325 up to 3 times a day with about 75% relief in addition to fentanyl 50 mcg/hour.  He denies any new fevers or chills, no new weakness.    Pain intervention history:  He had tried multiple injections in the past, the last time was over 10 years ago.  He has tried gabapentin in the past but had side effects.  He has not tried Lyrica or Topamax(has a history of renal calculi).He has been taking Percocet 10/325 up to 3 times a day with about 75% relief in addition to fentanyl 50 mcg/hour.He had tried using the Lyrica 50 mg nightly for a week and states that he has had jerking of his extremities which is the same thing that happened with gabapentin but not as severe.      Lab Results   Component Value Date    HGBA1C 5.5 01/03/2019         ROS:  He reports fatigability, weight gain, itching, shortness of breath, constipation, easy bruising, bleeding, history of kidney stones, joint stiffness, joint swelling, back pain, difficulty sleeping, depression and loss of balance.  Balance of review of systems is negative.    Past Medical History:   Diagnosis Date    Arthritis     Cancer 2012    left leg amputated, bladder     Encounter for blood transfusion     Hypertension     Presence of urostomy     Sleep apnea with use of continuous positive airway pressure (CPAP)     Wheelchair dependent        Past Surgical History:   Procedure Laterality Date    BACK SURGERY      4 surgeries    CARDIOVERSION      x 2    CARPAL TUNNEL RELEASE Bilateral     COLON SURGERY      COLONOSCOPY  2014    COLOSTOMY      COLOSTOMY CLOSURE      JOINT REPLACEMENT Right 2007    TKA    JOINT REPLACEMENT Right 02/21/2018    Spacer exchange TKA/lateral release/Exc bony overgrowth patella    JOINT REPLACEMENT Right 01/05/2019    Stage 2 revision, all 3 components/Open lateral release.    LEG AMPUTATION AT HIP Left 09/20/2012    MEDIPORT REMOVAL Left 8/30/2018    Procedure: REMOVAL, CATHETER, CENTRAL VENOUS,  TUNNELED, WITH PORT;  Surgeon: Stu Godinez MD;  Location: [facility];  Service: General;  Laterality: Left;    NECK SURGERY      2 surgeries    PORTACATH PLACEMENT      REVISION OF KNEE ARTHROPLASTY Right 2018    Procedure: REVISION, ARTHROPLASTY, KNEE-stage one;  Surgeon: Dillon Peacock MD;  Location: [facility];  Service: Orthopedics;  Laterality: Right;    REVISION OF KNEE ARTHROPLASTY Right 2019    Procedure: REVISION, ARTHROPLASTY, KNEE STAGE 2-OPEN LATERAL RELEASE, WITH REMOVAL OF IMPLANTS.;  Surgeon: Dillon Peacock MD;  Location: [facility];  Service: Orthopedics;  Laterality: Right;    TOTAL KNEE ARTHROPLASTY Right        Social History     Socioeconomic History    Marital status:      Spouse name: Not on file    Number of children: Not on file    Years of education: Not on file    Highest education level: Not on file   Occupational History    Not on file   Social Needs    Financial resource strain: Not on file    Food insecurity:     Worry: Not on file     Inability: Not on file    Transportation needs:     Medical: Not on file     Non-medical: Not on file   Tobacco Use    Smoking status: Former Smoker     Packs/day: 1.00     Types: Cigarettes     Last attempt to quit: 2000     Years since quittin.0    Smokeless tobacco: Former User    Tobacco comment: quit 18 years ago    Substance and Sexual Activity    Alcohol use: No     Frequency: Never    Drug use: No    Sexual activity: Never   Lifestyle    Physical activity:     Days per week: Not on file     Minutes per session: Not on file    Stress: Not on file   Relationships    Social connections:     Talks on phone: Not on file     Gets together: Not on file     Attends [employer] service: Not on file     Active member of club or organization: Not on file     Attends meetings of clubs or organizations: Not on file     Relationship status: Not on file   Other Topics Concern    Not on file   Social History Narrative     Not on file         Medications/Allergies: See med card    Vitals:    12/30/19 1014   BP: (!) 115/57   Pulse: 84   Resp: 18   Temp: 98.1 °F (36.7 °C)   TempSrc: Oral   SpO2: 95%   PainSc:   4   PainLoc: Back         Physical exam:  Gen: A and O x3, pleasant, well-groomed  Skin: No rashes or obvious lesions  HEENT: PERRLA, no obvious deformities on ears or in canals. Trachea midline.  CVS: Regular rate and rhythm, normal palpable pulses.  Resp:No increased work of breathing, symmetrical chest rise.  Abdomen: Soft, NT/ND.  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    Neuro:  Lower extremities: 5/5 strength right side  Reflexes: Patellar 0+, Achilles 0+ right side  Sensory:  Right leg: Intact and symmetrical to light touch and pinprick in L2-S1 dermatomes with some decreased sensation to light touch and pinprick throughout the entire leg.    Lumbar spine:  Lumbar spine:  Range of motion not tested, unable to stand without assistance.  [place]'s test causes no increased pain on either side.    Supine straight leg raise is negative on the right side.  Internal and external rotation of the hip causes no increased pain on either side.  Myofascial exam: No tenderness to palpation across lumbar paraspinous muscles.    Imaging:  None    Assessment:   The patient is a 74-year-old man with a history of bladder CA status post cystectomy also requiring left leg amputation, chronic low back pain with multiple lumbar surgeries who presents in referral [facility] for continued pain.    1. Chronic pain syndrome     2. Postlaminectomy syndrome of lumbar region     3. Right lumbar radiculopathy     4. Phantom limb pain     5. Renal calculus     6. Opioid use agreement exists  [facility] Drug Screen       Plan:  1.  For his back and leg pain, I will have him continue the fentanyl patch and Percocet 10/325 3 times daily, he does not show any signs of abuse or addiction and  unfortunately he does not have many other conservative options, especially any further surgery or procedures.  I have refilled his Percocet and fentanyl today, I have had him complete a urine drug screen.  2.  We discussed possible other options for treating his neuropathic pain, phantom leg pain. I had consider Topamax but he has renal calculi.  I am going to have him start Elavil 25 milligrams nightly to see if this helps with both his sleep and his phantom leg pain, back pain. He will contact me if developing any side effects with this.  Otherwise I will see him in 2 months or sooner as needed.

## 2020-01-04 LAB

## 2020-02-28 ENCOUNTER — OFFICE VISIT (OUTPATIENT)
Dept: PAIN MEDICINE | Facility: CLINIC | Age: 75
End: 2020-02-28
Payer: MEDICARE

## 2020-02-28 VITALS
DIASTOLIC BLOOD PRESSURE: 64 MMHG | OXYGEN SATURATION: 95 % | TEMPERATURE: 97 F | RESPIRATION RATE: 20 BRPM | SYSTOLIC BLOOD PRESSURE: 145 MMHG | HEART RATE: 64 BPM

## 2020-02-28 DIAGNOSIS — M96.1 POSTLAMINECTOMY SYNDROME OF LUMBAR REGION: ICD-10-CM

## 2020-02-28 DIAGNOSIS — G54.6 PHANTOM LIMB PAIN: ICD-10-CM

## 2020-02-28 DIAGNOSIS — M54.16 RIGHT LUMBAR RADICULOPATHY: ICD-10-CM

## 2020-02-28 DIAGNOSIS — Z79.891 OPIOID USE AGREEMENT EXISTS: ICD-10-CM

## 2020-02-28 DIAGNOSIS — G89.4 CHRONIC PAIN SYNDROME: Primary | ICD-10-CM

## 2020-02-28 PROCEDURE — 1101F PT FALLS ASSESS-DOCD LE1/YR: CPT | Mod: CPTII,S$GLB,, | Performed by: PHYSICIAN ASSISTANT

## 2020-02-28 PROCEDURE — 1101F PR PT FALLS ASSESS DOC 0-1 FALLS W/OUT INJ PAST YR: ICD-10-PCS | Mod: CPTII,S$GLB,, | Performed by: PHYSICIAN ASSISTANT

## 2020-02-28 PROCEDURE — 1159F PR MEDICATION LIST DOCUMENTED IN MEDICAL RECORD: ICD-10-PCS | Mod: S$GLB,,, | Performed by: PHYSICIAN ASSISTANT

## 2020-02-28 PROCEDURE — 3077F PR MOST RECENT SYSTOLIC BLOOD PRESSURE >= 140 MM HG: ICD-10-PCS | Mod: CPTII,S$GLB,, | Performed by: PHYSICIAN ASSISTANT

## 2020-02-28 PROCEDURE — 1159F MED LIST DOCD IN RCRD: CPT | Mod: S$GLB,,, | Performed by: PHYSICIAN ASSISTANT

## 2020-02-28 PROCEDURE — 99999 PR PBB SHADOW E&M-EST. PATIENT-LVL III: CPT | Mod: PBBFAC,,, | Performed by: PHYSICIAN ASSISTANT

## 2020-02-28 PROCEDURE — 99213 OFFICE O/P EST LOW 20 MIN: CPT | Mod: S$GLB,,, | Performed by: PHYSICIAN ASSISTANT

## 2020-02-28 PROCEDURE — 3078F PR MOST RECENT DIASTOLIC BLOOD PRESSURE < 80 MM HG: ICD-10-PCS | Mod: CPTII,S$GLB,, | Performed by: PHYSICIAN ASSISTANT

## 2020-02-28 PROCEDURE — 3077F SYST BP >= 140 MM HG: CPT | Mod: CPTII,S$GLB,, | Performed by: PHYSICIAN ASSISTANT

## 2020-02-28 PROCEDURE — 99213 PR OFFICE/OUTPT VISIT, EST, LEVL III, 20-29 MIN: ICD-10-PCS | Mod: S$GLB,,, | Performed by: PHYSICIAN ASSISTANT

## 2020-02-28 PROCEDURE — 1125F AMNT PAIN NOTED PAIN PRSNT: CPT | Mod: S$GLB,,, | Performed by: PHYSICIAN ASSISTANT

## 2020-02-28 PROCEDURE — 99999 PR PBB SHADOW E&M-EST. PATIENT-LVL III: ICD-10-PCS | Mod: PBBFAC,,, | Performed by: PHYSICIAN ASSISTANT

## 2020-02-28 PROCEDURE — 1125F PR PAIN SEVERITY QUANTIFIED, PAIN PRESENT: ICD-10-PCS | Mod: S$GLB,,, | Performed by: PHYSICIAN ASSISTANT

## 2020-02-28 PROCEDURE — 3078F DIAST BP <80 MM HG: CPT | Mod: CPTII,S$GLB,, | Performed by: PHYSICIAN ASSISTANT

## 2020-02-28 RX ORDER — FENTANYL 50 UG/1
1 PATCH TRANSDERMAL
Qty: 10 PATCH | Refills: 0 | Status: SHIPPED | OUTPATIENT
Start: 2020-02-28 | End: 2020-03-24 | Stop reason: SDUPTHER

## 2020-02-28 RX ORDER — OXYCODONE AND ACETAMINOPHEN 10; 325 MG/1; MG/1
1 TABLET ORAL EVERY 8 HOURS PRN
Qty: 90 TABLET | Refills: 0 | Status: SHIPPED | OUTPATIENT
Start: 2020-02-28 | End: 2020-03-24 | Stop reason: SDUPTHER

## 2020-03-24 ENCOUNTER — TELEPHONE (OUTPATIENT)
Dept: PAIN MEDICINE | Facility: CLINIC | Age: 75
End: 2020-03-24

## 2020-03-24 RX ORDER — FENTANYL 50 UG/1
1 PATCH TRANSDERMAL
Qty: 10 PATCH | Refills: 0 | Status: SHIPPED | OUTPATIENT
Start: 2020-03-29 | End: 2020-04-27 | Stop reason: SDUPTHER

## 2020-03-24 RX ORDER — OXYCODONE AND ACETAMINOPHEN 10; 325 MG/1; MG/1
1 TABLET ORAL EVERY 8 HOURS PRN
Qty: 90 TABLET | Refills: 0 | Status: SHIPPED | OUTPATIENT
Start: 2020-03-29 | End: 2020-04-27 | Stop reason: SDUPTHER

## 2020-03-24 NOTE — TELEPHONE ENCOUNTER
----- Message from Jessenia Lyons sent at 3/24/2020 10:50 AM CDT -----  Type:  RX Refill Request    Who Called:  Patient   Refill or New Rx:  refill  RX Name and Strength:  oxyCODONE-acetaminophen (PERCOCET)  mg per tablet every 8 hours  fentaNYL (DURAGESIC) 50 mcg/hr 1 x 72 hrs  Is this a 30 day or 90 day RX:  30  Preferred Pharmacy with phone number:    [address] [address]  Phone: [phone] Fax: [phone]  Local or Mail Order:  local  Ordering Provider:  Felipe Cowart Call Back Number:  [phone]     Additional Information:

## 2020-04-09 ENCOUNTER — TELEPHONE (OUTPATIENT)
Dept: PAIN MEDICINE | Facility: CLINIC | Age: 75
End: 2020-04-09

## 2020-04-09 NOTE — TELEPHONE ENCOUNTER
----- Message from Jens Hendrickson sent at 4/9/2020  3:24 PM CDT -----  Type:  Patient Returning Call    Who Called:  self  Who Left Message for Patient:    Does the patient know what this is regarding?:   Best Call Back Number:  [phone]  Additional Information:

## 2020-04-27 ENCOUNTER — OFFICE VISIT (OUTPATIENT)
Dept: PAIN MEDICINE | Facility: CLINIC | Age: 75
End: 2020-04-27
Payer: MEDICARE

## 2020-04-27 DIAGNOSIS — G89.4 CHRONIC PAIN SYNDROME: Primary | ICD-10-CM

## 2020-04-27 DIAGNOSIS — M54.16 RIGHT LUMBAR RADICULOPATHY: ICD-10-CM

## 2020-04-27 DIAGNOSIS — Z79.891 OPIOID USE AGREEMENT EXISTS: ICD-10-CM

## 2020-04-27 DIAGNOSIS — G54.6 PHANTOM LIMB PAIN: ICD-10-CM

## 2020-04-27 DIAGNOSIS — M96.1 POSTLAMINECTOMY SYNDROME OF LUMBAR REGION: ICD-10-CM

## 2020-04-27 PROCEDURE — 99441 PR PHYSICIAN TELEPHONE EVALUATION 5-10 MIN: ICD-10-PCS | Mod: 95,,, | Performed by: PHYSICIAN ASSISTANT

## 2020-04-27 PROCEDURE — 99441 PR PHYSICIAN TELEPHONE EVALUATION 5-10 MIN: CPT | Mod: 95,,, | Performed by: PHYSICIAN ASSISTANT

## 2020-04-27 RX ORDER — OXYCODONE AND ACETAMINOPHEN 10; 325 MG/1; MG/1
1 TABLET ORAL EVERY 8 HOURS PRN
Qty: 90 TABLET | Refills: 0 | Status: SHIPPED | OUTPATIENT
Start: 2020-04-29 | End: 2020-05-29

## 2020-04-27 RX ORDER — OXYCODONE AND ACETAMINOPHEN 10; 325 MG/1; MG/1
1 TABLET ORAL EVERY 8 HOURS PRN
Qty: 90 TABLET | Refills: 0 | Status: SHIPPED | OUTPATIENT
Start: 2020-05-29 | End: 2020-06-26 | Stop reason: SDUPTHER

## 2020-04-27 RX ORDER — FENTANYL 50 UG/1
1 PATCH TRANSDERMAL
Qty: 10 PATCH | Refills: 0 | Status: SHIPPED | OUTPATIENT
Start: 2020-05-29 | End: 2020-06-26 | Stop reason: SDUPTHER

## 2020-04-27 RX ORDER — FENTANYL 50 UG/1
1 PATCH TRANSDERMAL
Qty: 10 PATCH | Refills: 0 | Status: SHIPPED | OUTPATIENT
Start: 2020-04-29 | End: 2020-05-29

## 2020-04-27 RX ORDER — OXYCODONE AND ACETAMINOPHEN 10; 325 MG/1; MG/1
1 TABLET ORAL EVERY 8 HOURS PRN
Qty: 90 TABLET | Refills: 0 | Status: SHIPPED | OUTPATIENT
Start: 2020-06-28 | End: 2020-07-28

## 2020-04-27 RX ORDER — FENTANYL 50 UG/1
1 PATCH TRANSDERMAL
Qty: 10 PATCH | Refills: 0 | Status: SHIPPED | OUTPATIENT
Start: 2020-06-28 | End: 2020-07-28

## 2020-04-27 NOTE — PROGRESS NOTES
The reason for the audio only service rather than synchronous audio and video virtual visit was related to technical difficulties or patient preference/necessity.     This service was not originating from a related E/M service provided within the previous 7 days nor will  to an E/M service or procedure within the next 24 hours or my soonest available appointment.  Prevailing standard of care was able to be met in this audio-only visit.      The patient location is: [facility]  The chief complaint leading to consultation is: back pain  Visit type: Virtual visit with audio only  Total time spent with patient: 10 minutes  Each patient to whom he or she provides medical services by telemedicine is:  (1) informed of the relationship between the physician and patient and the respective role of any other health care provider with respect to management of the patient; and (2) notified that he or she may decline to receive medical services by telemedicine and may withdraw from such care at any time.    This note was completed with dictation software and grammatical errors may exist.    CC:  Back pain    HPI:  The patient is a 75-year-old man with a history of bladder CA status post cystectomy also requiring left leg amputation, chronic low back pain with multiple lumbar surgeries who presents in referral from [facility] for continued pain.  He presents in follow-up today with continued back pain.  This radiates in his buttocks and intermittently to his right leg.  He does have some phantom limb pain on the left.  Majority of this pain is in his back and buttocks.  He also states that he will be having a kidney stone removed at the end of May.  He is going to be getting a new CPAP.  He discontinued Amitriptyline and is going to try this if a new CPAP does not help with his sleep.  Otherwise, he reports adequate control of his pain with his pain medication.    Previous history:  The patient has  had a total of 4 back surgeries with fusions, laminectomy is, continued right leg radicular pain throughout the entire extremity and foot.  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  In the past year he had a revision of his right knee replacement but had an infection and required a spacer being placed, long-term antibiotics.  He has been recovering from this and has consulted with Neurosurgery about his back and leg pain and they do not feel that he is a surgical candidate at this point.  His pain is located across the bilateral buttocks and throughout the entire right leg.  He describes it as aching, burning, grabbing, sharp and electric.  It is worse with prolonged sitting, standing, lying down or getting out of a bed or a chair.  He does get some relief with medications. He has been taking Percocet 10/325 up to 3 times a day with about 75% relief in addition to fentanyl 50 mcg/hour.  He denies any new fevers or chills, no new weakness.    Pain intervention history:  He had tried multiple injections in the past, the last time was over 10 years ago.  He has tried gabapentin in the past but had side effects.  He has not tried Lyrica or Topamax(has a history of renal calculi).He has been taking Percocet 10/325 up to 3 times a day with about 75% relief in addition to fentanyl 50 mcg/hour.He had tried using the Lyrica 50 mg nightly for a week and states that he has had jerking of his extremities which is the same thing that happened with gabapentin but not as severe.      Lab Results   Component Value Date    HGBA1C 5.5 01/03/2019         ROS:  He reports fatigability, weight gain, itching, shortness of breath, constipation, easy bruising, bleeding, history of kidney stones, joint stiffness, joint swelling, back pain, difficulty sleeping, depression and loss of balance.  Balance of review of systems is negative.    Past Medical History:   Diagnosis Date    Arthritis     Cancer 2012     left leg amputated, bladder     Encounter for blood transfusion     Hypertension     Presence of urostomy     Sleep apnea with use of continuous positive airway pressure (CPAP)     Wheelchair dependent        Past Surgical History:   Procedure Laterality Date    BACK SURGERY      4 surgeries    CARDIOVERSION      x 2    CARPAL TUNNEL RELEASE Bilateral     COLON SURGERY      COLONOSCOPY  2014    COLOSTOMY      COLOSTOMY CLOSURE      JOINT REPLACEMENT Right 2007    TKA    JOINT REPLACEMENT Right 02/21/2018    Spacer exchange TKA/lateral release/Exc bony overgrowth patella    JOINT REPLACEMENT Right 01/05/2019    Stage 2 revision, all 3 components/Open lateral release.    LEG AMPUTATION AT HIP Left 09/20/2012    MEDIPORT REMOVAL Left 8/30/2018    Procedure: REMOVAL, CATHETER, CENTRAL VENOUS, TUNNELED, WITH PORT;  Surgeon: Stu Godinez MD;  Location: [facility] OR;  Service: General;  Laterality: Left;    NECK SURGERY      2 surgeries    PORTACATH PLACEMENT      REVISION OF KNEE ARTHROPLASTY Right 6/26/2018    Procedure: REVISION, ARTHROPLASTY, KNEE-stage one;  Surgeon: Dillon Peacock MD;  Location: [facility] OR;  Service: Orthopedics;  Laterality: Right;    REVISION OF KNEE ARTHROPLASTY Right 1/5/2019    Procedure: REVISION, ARTHROPLASTY, KNEE STAGE 2-OPEN LATERAL RELEASE, WITH REMOVAL OF IMPLANTS.;  Surgeon: Dillon Peacock MD;  Location: [facility] OR;  Service: Orthopedics;  Laterality: Right;    TOTAL KNEE ARTHROPLASTY Right        Social History     Socioeconomic History    Marital status:      Spouse name: Not on file    Number of children: Not on file    Years of education: Not on file    Highest education level: Not on file   Occupational History    Not on file   Social Needs    Financial resource strain: Not on file    Food insecurity:     Worry: Not on file     Inability: Not on file    Transportation needs:     Medical: Not on file     Non-medical: Not on file   Tobacco Use     Smoking status: Former Smoker     Packs/day: 1.00     Types: Cigarettes     Last attempt to quit: 2000     Years since quittin.3    Smokeless tobacco: Former User    Tobacco comment: quit 18 years ago    Substance and Sexual Activity    Alcohol use: No     Frequency: Never    Drug use: No    Sexual activity: Never   Lifestyle    Physical activity:     Days per week: Not on file     Minutes per session: Not on file    Stress: Not on file   Relationships    Social connections:     Talks on phone: Not on file     Gets together: Not on file     Attends [employer] service: Not on file     Active member of club or organization: Not on file     Attends meetings of clubs or organizations: Not on file     Relationship status: Not on file   Other Topics Concern    Not on file   Social History Narrative    Not on file         Medications/Allergies: See med card    There were no vitals filed for this visit.      Physical exam:  Audio visit only    Imaging:  None    Assessment:   The patient is a 75-year-old man with a history of bladder CA status post cystectomy also requiring left leg amputation, chronic low back pain with multiple lumbar surgeries who presents in referral from [facility] for continued pain.    1. Chronic pain syndrome     2. Postlaminectomy syndrome of lumbar region     3. Right lumbar radiculopathy     4. Phantom limb pain     5. Opioid use agreement exists         Plan:  1.  Dr. Renae provided prescriptions for oxycodone-acetaminophen 10/325 up to 3 times a day as needed for pain and fentanyl 50 micrograms per hour.  I have reviewed the [place] Board [employer] website and there are no abberancies.    2.  Follow-up in 3 months or sooner as needed.

## 2020-04-27 NOTE — TELEPHONE ENCOUNTER
Virtual visit completed.    I have reviewed the [place] Board [employer] website and there are no abberancies.      Please send prescriptions to his pharmacy.

## 2020-06-26 ENCOUNTER — OFFICE VISIT (OUTPATIENT)
Dept: PAIN MEDICINE | Facility: CLINIC | Age: 75
End: 2020-06-26
Payer: MEDICARE

## 2020-06-26 VITALS
SYSTOLIC BLOOD PRESSURE: 161 MMHG | HEART RATE: 62 BPM | TEMPERATURE: 99 F | OXYGEN SATURATION: 96 % | DIASTOLIC BLOOD PRESSURE: 74 MMHG | RESPIRATION RATE: 20 BRPM

## 2020-06-26 DIAGNOSIS — Z79.891 OPIOID USE AGREEMENT EXISTS: ICD-10-CM

## 2020-06-26 DIAGNOSIS — G56.03 BILATERAL CARPAL TUNNEL SYNDROME: Primary | ICD-10-CM

## 2020-06-26 DIAGNOSIS — M96.1 POSTLAMINECTOMY SYNDROME OF LUMBAR REGION: ICD-10-CM

## 2020-06-26 DIAGNOSIS — G89.4 CHRONIC PAIN SYNDROME: ICD-10-CM

## 2020-06-26 PROCEDURE — 99999 PR PBB SHADOW E&M-EST. PATIENT-LVL IV: CPT | Mod: PBBFAC,,, | Performed by: PHYSICIAN ASSISTANT

## 2020-06-26 PROCEDURE — 3077F PR MOST RECENT SYSTOLIC BLOOD PRESSURE >= 140 MM HG: ICD-10-PCS | Mod: CPTII,S$GLB,, | Performed by: PHYSICIAN ASSISTANT

## 2020-06-26 PROCEDURE — 99213 OFFICE O/P EST LOW 20 MIN: CPT | Mod: S$GLB,,, | Performed by: PHYSICIAN ASSISTANT

## 2020-06-26 PROCEDURE — 3078F DIAST BP <80 MM HG: CPT | Mod: CPTII,S$GLB,, | Performed by: PHYSICIAN ASSISTANT

## 2020-06-26 PROCEDURE — 99213 PR OFFICE/OUTPT VISIT, EST, LEVL III, 20-29 MIN: ICD-10-PCS | Mod: S$GLB,,, | Performed by: PHYSICIAN ASSISTANT

## 2020-06-26 PROCEDURE — 1125F PR PAIN SEVERITY QUANTIFIED, PAIN PRESENT: ICD-10-PCS | Mod: S$GLB,,, | Performed by: PHYSICIAN ASSISTANT

## 2020-06-26 PROCEDURE — 1101F PT FALLS ASSESS-DOCD LE1/YR: CPT | Mod: CPTII,S$GLB,, | Performed by: PHYSICIAN ASSISTANT

## 2020-06-26 PROCEDURE — 3078F PR MOST RECENT DIASTOLIC BLOOD PRESSURE < 80 MM HG: ICD-10-PCS | Mod: CPTII,S$GLB,, | Performed by: PHYSICIAN ASSISTANT

## 2020-06-26 PROCEDURE — 99999 PR PBB SHADOW E&M-EST. PATIENT-LVL IV: ICD-10-PCS | Mod: PBBFAC,,, | Performed by: PHYSICIAN ASSISTANT

## 2020-06-26 PROCEDURE — 1125F AMNT PAIN NOTED PAIN PRSNT: CPT | Mod: S$GLB,,, | Performed by: PHYSICIAN ASSISTANT

## 2020-06-26 PROCEDURE — 1159F MED LIST DOCD IN RCRD: CPT | Mod: S$GLB,,, | Performed by: PHYSICIAN ASSISTANT

## 2020-06-26 PROCEDURE — 3077F SYST BP >= 140 MM HG: CPT | Mod: CPTII,S$GLB,, | Performed by: PHYSICIAN ASSISTANT

## 2020-06-26 PROCEDURE — 1159F PR MEDICATION LIST DOCUMENTED IN MEDICAL RECORD: ICD-10-PCS | Mod: S$GLB,,, | Performed by: PHYSICIAN ASSISTANT

## 2020-06-26 PROCEDURE — 1101F PR PT FALLS ASSESS DOC 0-1 FALLS W/OUT INJ PAST YR: ICD-10-PCS | Mod: CPTII,S$GLB,, | Performed by: PHYSICIAN ASSISTANT

## 2020-06-26 RX ORDER — OXYCODONE AND ACETAMINOPHEN 10; 325 MG/1; MG/1
1 TABLET ORAL EVERY 8 HOURS PRN
Qty: 90 TABLET | Refills: 0 | Status: SHIPPED | OUTPATIENT
Start: 2020-07-28 | End: 2020-08-27

## 2020-06-26 RX ORDER — FENTANYL 50 UG/1
1 PATCH TRANSDERMAL
Qty: 10 PATCH | Refills: 0 | Status: SHIPPED | OUTPATIENT
Start: 2020-07-28 | End: 2020-08-27

## 2020-06-26 RX ORDER — OXYCODONE AND ACETAMINOPHEN 10; 325 MG/1; MG/1
1 TABLET ORAL EVERY 8 HOURS PRN
Qty: 90 TABLET | Refills: 0 | Status: SHIPPED | OUTPATIENT
Start: 2020-08-27 | End: 2020-09-26

## 2020-06-26 RX ORDER — FENTANYL 50 UG/1
1 PATCH TRANSDERMAL
Qty: 10 PATCH | Refills: 0 | Status: SHIPPED | OUTPATIENT
Start: 2020-08-27 | End: 2020-09-26

## 2020-06-26 NOTE — PROGRESS NOTES
This note was completed with dictation software and grammatical errors may exist.    CC:  Back pain    HPI:  The patient is a 75-year-old man with a history of bladder CA status post cystectomy also requiring left leg amputation, chronic low back pain with multiple lumbar surgeries who presents in referral from [facility] for continued pain.  He presents in follow-up today with worsening pain.  He reports having worsening low back and bilateral buttock pain but most significantly pain in his forearms and hands.  He reports a history of carpal tunnel surgery bilaterally over 15 years ago.  He currently describes his pain is intermittent and a toothache, denies having any numbness.  He denies having any neck pain or weakness associated with this.  He requested to increase his pain medication because of his hand symptoms.    Previous history:  The patient has had a total of 4 back surgeries with fusions, laminectomy is, continued right leg radicular pain throughout the entire extremity and foot.  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  In the past year he had a revision of his right knee replacement but had an infection and required a spacer being placed, long-term antibiotics.  He has been recovering from this and has consulted with Neurosurgery about his back and leg pain and they do not feel that he is a surgical candidate at this point.  His pain is located across the bilateral buttocks and throughout the entire right leg.  He describes it as aching, burning, grabbing, sharp and electric.  It is worse with prolonged sitting, standing, lying down or getting out of a bed or a chair.  He does get some relief with medications. He has been taking Percocet 10/325 up to 3 times a day with about 75% relief in addition to fentanyl 50 mcg/hour.  He denies any new fevers or chills, no new weakness.    Pain intervention history:  He had tried multiple injections in the  past, the last time was over 10 years ago.  He has tried gabapentin in the past but had side effects.  He has not tried Lyrica or Topamax(has a history of renal calculi).He has been taking Percocet 10/325 up to 3 times a day with about 75% relief in addition to fentanyl 50 mcg/hour.He had tried using the Lyrica 50 mg nightly for a week and states that he has had jerking of his extremities which is the same thing that happened with gabapentin but not as severe.      Lab Results   Component Value Date    HGBA1C 5.5 01/03/2019         ROS:  He reports fatigability, weight gain, itching, shortness of breath, constipation, easy bruising, bleeding, history of kidney stones, joint stiffness, joint swelling, back pain, difficulty sleeping, depression and loss of balance.  Balance of review of systems is negative.    Past Medical History:   Diagnosis Date    Arthritis     Cancer 2012    left leg amputated, bladder     Encounter for blood transfusion     Hypertension     Presence of urostomy     Sleep apnea with use of continuous positive airway pressure (CPAP)     Wheelchair dependent        Past Surgical History:   Procedure Laterality Date    BACK SURGERY      4 surgeries    CARDIOVERSION      x 2    CARPAL TUNNEL RELEASE Bilateral     COLON SURGERY      COLONOSCOPY  2014    COLOSTOMY      COLOSTOMY CLOSURE      JOINT REPLACEMENT Right 2007    TKA    JOINT REPLACEMENT Right 02/21/2018    Spacer exchange TKA/lateral release/Exc bony overgrowth patella    JOINT REPLACEMENT Right 01/05/2019    Stage 2 revision, all 3 components/Open lateral release.    LEG AMPUTATION AT HIP Left 09/20/2012    MEDIPORT REMOVAL Left 8/30/2018    Procedure: REMOVAL, CATHETER, CENTRAL VENOUS, TUNNELED, WITH PORT;  Surgeon: Stu Godinez MD;  Location: [facility];  Service: General;  Laterality: Left;    NECK SURGERY      2 surgeries    PORTACATH PLACEMENT      REVISION OF KNEE ARTHROPLASTY Right 6/26/2018    Procedure:  REVISION, ARTHROPLASTY, KNEE-stage one;  Surgeon: Dillon Peacock MD;  Location: [facility];  Service: Orthopedics;  Laterality: Right;    REVISION OF KNEE ARTHROPLASTY Right 2019    Procedure: REVISION, ARTHROPLASTY, KNEE STAGE 2-OPEN LATERAL RELEASE, WITH REMOVAL OF IMPLANTS.;  Surgeon: Dillon Peacock MD;  Location: [facility];  Service: Orthopedics;  Laterality: Right;    TOTAL KNEE ARTHROPLASTY Right        Social History     Socioeconomic History    Marital status:      Spouse name: Not on file    Number of children: Not on file    Years of education: Not on file    Highest education level: Not on file   Occupational History    Not on file   Social Needs    Financial resource strain: Not on file    Food insecurity     Worry: Not on file     Inability: Not on file    Transportation needs     Medical: Not on file     Non-medical: Not on file   Tobacco Use    Smoking status: Former Smoker     Packs/day: 1.00     Types: Cigarettes     Quit date: 2000     Years since quittin.4    Smokeless tobacco: Former User    Tobacco comment: quit 18 years ago    Substance and Sexual Activity    Alcohol use: No     Frequency: Never    Drug use: No    Sexual activity: Never   Lifestyle    Physical activity     Days per week: Not on file     Minutes per session: Not on file    Stress: Not on file   Relationships    Social connections     Talks on phone: Not on file     Gets together: Not on file     Attends [employer] service: Not on file     Active member of club or organization: Not on file     Attends meetings of clubs or organizations: Not on file     Relationship status: Not on file   Other Topics Concern    Not on file   Social History Narrative    Not on file         Medications/Allergies: See med card    Vitals:    20 0856   BP: (!) 161/74   Pulse: 62   Resp: 20   Temp: 98.6 °F (37 °C)   TempSrc: Temporal   SpO2: 96%   PainSc:   6   PainLoc: Generalized         Physical exam:  Gen: A  and O x3, pleasant, well-groomed  Skin: No rashes or obvious lesions  HEENT: PERRLA, no obvious deformities on ears or in canals. Trachea midline.  CVS: Regular rate and rhythm, normal palpable pulses.  Resp:No increased work of breathing, symmetrical chest rise.  Abdomen: Soft, NT/ND.  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     Neuro:  Lower extremities: 5/5 strength right side  Reflexes: Patellar 0+, Achilles 0+ right side  Sensory:  Right leg: Intact and symmetrical to light touch and pinprick in L2-S1 dermatomes with some decreased sensation to light touch and pinprick throughout the entire leg.     Lumbar spine:  Lumbar spine:  Range of motion not tested, unable to stand without assistance.  Chico's test causes no increased pain on either side.    Supine straight leg raise is negative on the right side.  Internal and external rotation of the hip causes no increased pain on either side.  Myofascial exam: No tenderness to palpation across lumbar paraspinous muscles.    Tinel's test positive on the left and Phalen's positive on the right.    Imaging:  None    Assessment:   The patient is a 75-year-old man with a history of bladder CA status post cystectomy also requiring left leg amputation, chronic low back pain with multiple lumbar surgeries who presents in referral from [facility] for continued pain.    1. Bilateral carpal tunnel syndrome  Ambulatory referral/consult to Physical Medicine Rehab   2. Chronic pain syndrome     3. Postlaminectomy syndrome of lumbar region     4. Opioid use agreement exists         Plan:  1.  I reviewed his case with Dr. Renae and we suggest against increasing pain medication and instead we should treat the problem.  Dr. Renae provided prescriptions for oxycodone-acetaminophen 10/325 up to 3 times a day as needed for pain and fentanyl 50 micrograms per hour.  I have reviewed the [place] Board of Pharmacy website and  there are no abberancies.  For his bilateral hand pain, I will have him see Dr. Zuñiga for further evaluation.  2.  Follow-up in 3 months or sooner as needed.

## 2020-06-26 NOTE — TELEPHONE ENCOUNTER
Patient seen in clinic.  Please send prescriptions to his pharmacy.    I have reviewed the [place] Board of Pharmacy website and there are no abberancies.

## 2020-07-07 ENCOUNTER — OFFICE VISIT (OUTPATIENT)
Dept: PHYSICAL MEDICINE AND REHAB | Facility: CLINIC | Age: 75
End: 2020-07-07
Payer: MEDICARE

## 2020-07-07 VITALS — HEIGHT: 71 IN | WEIGHT: 259 LBS | BODY MASS INDEX: 36.26 KG/M2

## 2020-07-07 DIAGNOSIS — G56.03 BILATERAL CARPAL TUNNEL SYNDROME: ICD-10-CM

## 2020-07-07 PROCEDURE — 99999 PR PBB SHADOW E&M-EST. PATIENT-LVL IV: ICD-10-PCS | Mod: PBBFAC,,, | Performed by: PHYSICAL MEDICINE & REHABILITATION

## 2020-07-07 PROCEDURE — 1159F PR MEDICATION LIST DOCUMENTED IN MEDICAL RECORD: ICD-10-PCS | Mod: S$GLB,,, | Performed by: PHYSICAL MEDICINE & REHABILITATION

## 2020-07-07 PROCEDURE — 99204 PR OFFICE/OUTPT VISIT, NEW, LEVL IV, 45-59 MIN: ICD-10-PCS | Mod: 25,GC,S$GLB, | Performed by: PHYSICAL MEDICINE & REHABILITATION

## 2020-07-07 PROCEDURE — 76942 ECHO GUIDE FOR BIOPSY: CPT | Mod: S$GLB,,, | Performed by: PHYSICAL MEDICINE & REHABILITATION

## 2020-07-07 PROCEDURE — 1101F PR PT FALLS ASSESS DOC 0-1 FALLS W/OUT INJ PAST YR: ICD-10-PCS | Mod: CPTII,S$GLB,, | Performed by: PHYSICAL MEDICINE & REHABILITATION

## 2020-07-07 PROCEDURE — 99204 OFFICE O/P NEW MOD 45 MIN: CPT | Mod: 25,GC,S$GLB, | Performed by: PHYSICAL MEDICINE & REHABILITATION

## 2020-07-07 PROCEDURE — 1125F PR PAIN SEVERITY QUANTIFIED, PAIN PRESENT: ICD-10-PCS | Mod: S$GLB,,, | Performed by: PHYSICAL MEDICINE & REHABILITATION

## 2020-07-07 PROCEDURE — 1159F MED LIST DOCD IN RCRD: CPT | Mod: S$GLB,,, | Performed by: PHYSICAL MEDICINE & REHABILITATION

## 2020-07-07 PROCEDURE — 76942 CARPAL TUNNEL: R INTERCARPAL, L INTERCARPAL: ICD-10-PCS | Mod: S$GLB,,, | Performed by: PHYSICAL MEDICINE & REHABILITATION

## 2020-07-07 PROCEDURE — 99999 PR PBB SHADOW E&M-EST. PATIENT-LVL IV: CPT | Mod: PBBFAC,,, | Performed by: PHYSICAL MEDICINE & REHABILITATION

## 2020-07-07 PROCEDURE — 20526 THER INJECTION CARP TUNNEL: CPT | Mod: 50,S$GLB,, | Performed by: PHYSICAL MEDICINE & REHABILITATION

## 2020-07-07 PROCEDURE — 3008F BODY MASS INDEX DOCD: CPT | Mod: CPTII,S$GLB,, | Performed by: PHYSICAL MEDICINE & REHABILITATION

## 2020-07-07 PROCEDURE — 1101F PT FALLS ASSESS-DOCD LE1/YR: CPT | Mod: CPTII,S$GLB,, | Performed by: PHYSICAL MEDICINE & REHABILITATION

## 2020-07-07 PROCEDURE — 20526 PR INJECT CARPAL TUNNEL: ICD-10-PCS | Mod: 50,S$GLB,, | Performed by: PHYSICAL MEDICINE & REHABILITATION

## 2020-07-07 PROCEDURE — 1125F AMNT PAIN NOTED PAIN PRSNT: CPT | Mod: S$GLB,,, | Performed by: PHYSICAL MEDICINE & REHABILITATION

## 2020-07-07 PROCEDURE — 3008F PR BODY MASS INDEX (BMI) DOCUMENTED: ICD-10-PCS | Mod: CPTII,S$GLB,, | Performed by: PHYSICAL MEDICINE & REHABILITATION

## 2020-07-07 RX ORDER — BETAMETHASONE SODIUM PHOSPHATE AND BETAMETHASONE ACETATE 3; 3 MG/ML; MG/ML
6 INJECTION, SUSPENSION INTRA-ARTICULAR; INTRALESIONAL; INTRAMUSCULAR; SOFT TISSUE
Status: DISCONTINUED | OUTPATIENT
Start: 2020-07-07 | End: 2020-07-07 | Stop reason: HOSPADM

## 2020-07-07 RX ADMIN — BETAMETHASONE SODIUM PHOSPHATE AND BETAMETHASONE ACETATE 6 MG: 3; 3 INJECTION, SUSPENSION INTRA-ARTICULAR; INTRALESIONAL; INTRAMUSCULAR; SOFT TISSUE at 09:07

## 2020-07-07 NOTE — PROGRESS NOTES
[facility]    Consulting Provider: Lazarus Weinstein    CHIEF COMPLAINT:   Chief Complaint   Patient presents with    Hand Pain     Bilateral      HISTORY OF PRESENT ILLNESS: Edmundo Beyer is a 75 y.o. male who presents to me as a new patient. PMHx includes a left leg amputation due to cancer in 2012 (predominately confined to a wheelchair) and chronic pain - sees Dr. Renae for pain management. Takes Percocet 10 TID and fentanyl 50 mg every 3 days. History of bilateral carpal tunnel releases by Dr. Dillon Peacock 12-13 years ago. States he made a full recovery without any recurrent issues until about 1 year ago. Chief complaint today is bilateral hand pain and subjective weakness. Pain localizes to the thenar and hypothenar eminence. Denies any numbness and tingling complaints which he was having prior to his CTR. States his symptoms are equal bilaterally. No night complaints.     Review of Systems   Constitutional: Negative for fever.   HENT: Negative for drooling.    Eyes: Negative for discharge.   Respiratory: Negative for choking.    Cardiovascular: Negative for chest pain.   Genitourinary: Negative for flank pain.   Skin: Negative for wound.   Allergic/Immunologic: Negative for immunocompromised state.   Neurological: Negative for tremors and syncope.   Psychiatric/Behavioral: Negative for behavioral problems.     Past Medical History:   Past Medical History:   Diagnosis Date    Arthritis     Cancer 2012    left leg amputated, bladder     Encounter for blood transfusion     Hypertension     Presence of urostomy     Sleep apnea with use of continuous positive airway pressure (CPAP)     Wheelchair dependent        Past Surgical History:   Past Surgical History:   Procedure Laterality Date    BACK SURGERY      4 surgeries    CARDIOVERSION      x 2    CARPAL TUNNEL RELEASE Bilateral     COLON SURGERY      COLONOSCOPY  2014    COLOSTOMY      COLOSTOMY CLOSURE      JOINT  REPLACEMENT Right 2007    TKA    JOINT REPLACEMENT Right 02/21/2018    Spacer exchange TKA/lateral release/Exc bony overgrowth patella    JOINT REPLACEMENT Right 01/05/2019    Stage 2 revision, all 3 components/Open lateral release.    LEG AMPUTATION AT HIP Left 09/20/2012    MEDIPORT REMOVAL Left 8/30/2018    Procedure: REMOVAL, CATHETER, CENTRAL VENOUS, TUNNELED, WITH PORT;  Surgeon: Stu Godinez MD;  Location: [facility] OR;  Service: General;  Laterality: Left;    NECK SURGERY      2 surgeries    PORTACATH PLACEMENT      REVISION OF KNEE ARTHROPLASTY Right 6/26/2018    Procedure: REVISION, ARTHROPLASTY, KNEE-stage one;  Surgeon: Dillon Peacock MD;  Location: [facility] OR;  Service: Orthopedics;  Laterality: Right;    REVISION OF KNEE ARTHROPLASTY Right 1/5/2019    Procedure: REVISION, ARTHROPLASTY, KNEE STAGE 2-OPEN LATERAL RELEASE, WITH REMOVAL OF IMPLANTS.;  Surgeon: Dillon Peacock MD;  Location: [facility] OR;  Service: Orthopedics;  Laterality: Right;    TOTAL KNEE ARTHROPLASTY Right        Family History:   Family History   Adopted: Yes       Medications:   Current Outpatient Medications on File Prior to Visit   Medication Sig Dispense Refill    allopurinol (ZYLOPRIM) 100 MG tablet       amitriptyline (ELAVIL) 25 MG tablet Take 1 tablet (25 mg total) by mouth every evening. 30 tablet 2    amlodipine (NORVASC) 10 MG tablet Take 10 mg by mouth once daily. Take am of surgery with a sip of water      amoxicillin-clavulanate 500-125mg (AUGMENTIN) 500-125 mg Tab Take 1 tablet by mouth 2 (two) times daily.  0    escitalopram oxalate (LEXAPRO) 10 MG tablet Take 10 mg by mouth once daily. Take am of surgery with a sip of water      fentaNYL (DURAGESIC) 50 mcg/hr Place 1 patch onto the skin every 72 hours. 10 patch 0    [START ON 7/28/2020] fentaNYL (DURAGESIC) 50 mcg/hr Place 1 patch onto the skin every 72 hours. 10 patch 0    [START ON 8/27/2020] fentaNYL (DURAGESIC) 50 mcg/hr Place 1 patch onto the skin  every 72 hours. 10 patch 0    ferrous gluconate (FERGON) 324 MG tablet Take 1 tablet (324 mg total) by mouth daily with breakfast. (Patient taking differently: Take 324 mg by mouth daily with breakfast. Hold am of surgery)      furosemide (LASIX) 40 MG tablet       metoprolol tartrate (LOPRESSOR) 50 MG tablet Take 50 mg by mouth 2 (two) times daily. Take am of surgery with a sip of water      oxyCODONE-acetaminophen (PERCOCET)  mg per tablet Take 1 tablet by mouth every 8 (eight) hours as needed for Pain. 90 tablet 0    [START ON 2020] oxyCODONE-acetaminophen (PERCOCET)  mg per tablet Take 1 tablet by mouth every 8 (eight) hours as needed for Pain. 90 tablet 0    [START ON 2020] oxyCODONE-acetaminophen (PERCOCET)  mg per tablet Take 1 tablet by mouth every 8 (eight) hours as needed for Pain. 90 tablet 0    pantoprazole (PROTONIX) 40 MG tablet Take 40 mg by mouth once daily. Take am of surgery with a sip of water      rosuvastatin (CRESTOR) 10 MG tablet        No current facility-administered medications on file prior to visit.        Allergies:   Review of patient's allergies indicates:   Allergen Reactions    Hydromorphone Itching    Morphine Itching       Social History:   Social History     Socioeconomic History    Marital status:      Spouse name: Not on file    Number of children: Not on file    Years of education: Not on file    Highest education level: Not on file   Occupational History    Not on file   Social Needs    Financial resource strain: Not on file    Food insecurity     Worry: Not on file     Inability: Not on file    Transportation needs     Medical: Not on file     Non-medical: Not on file   Tobacco Use    Smoking status: Former Smoker     Packs/day: 1.00     Types: Cigarettes     Quit date:      Years since quittin.5    Smokeless tobacco: Former User    Tobacco comment: quit 18 years ago    Substance and Sexual Activity    Alcohol  "use: No     Frequency: Never    Drug use: No    Sexual activity: Never   Lifestyle    Physical activity     Days per week: Not on file     Minutes per session: Not on file    Stress: Not on file   Relationships    Social connections     Talks on phone: Not on file     Gets together: Not on file     Attends Latter-day service: Not on file     Active member of club or organization: Not on file     Attends meetings of clubs or organizations: Not on file     Relationship status: Not on file   Other Topics Concern    Not on file   Social History Narrative    Not on file     PHYSICAL EXAMINATION:   General    Vitals:    07/07/20 0913   Weight: 117.5 kg (259 lb)   Height: 5' 11" (1.803 m)     Constitutional: Oriented to person, place, and time. No apparent distress. Appears well-developed and well-nourished. Pleasant.  HENT:   Head: Normocephalic and atraumatic.   Eyes: Right eye exhibits no discharge. Left eye exhibits no discharge. No scleral icterus.   Pulmonary/Chest: Effort normal. No respiratory distress.   Abdominal: There is no guarding.   Neurological: Alert and oriented to person, place, and time.   Psychiatric: Behavior is normal.   Right Hand Exam     Tenderness   The patient is experiencing no tenderness.     Range of Motion   Wrist   Extension: 40   Flexion: 70     Muscle Strength   Wrist extension: 5/5   Wrist flexion: 5/5   : 5/5     Tests   Phalens sign: positive  Tinel's sign (median nerve): negative  Finkelstein's test: negative    Other   Erythema: absent  Scars: present  Sensation: normal  Pulse: present    Comments:  Flattening on the Thenar [place].   Positive Holli's.   1+ Reflex  Negative Beltrán.   5/5 resisted APB  5/5 hand intrinsic, wrist flexion/extension      Left Hand Exam     Tenderness   The patient is experiencing no tenderness.     Range of Motion   Wrist   Extension: 40   Flexion: 70     Muscle Strength   Wrist extension: 5/5   Wrist flexion: 5/5   :  5/5     Tests " "  Phalens sign: positive  Tinel's sign (median nerve): negative  Finkelstein's test: negative    Other   Erythema: absent  Scars: present  Sensation: normal  Pulse: present    Comments:  Flattening on the Thenar [place].   Positive Holli's.   1+ Reflex.   Negative Beltrán.   4/5 resisted APB  5/5 hand intrinsic, wrist flexion/extension        INSPECTION: There is no swelling, ecchymoses, erythema of the hands.  GAIT/DYNAMIC: Wheelchair.    Imaging  Diagnostic Ultrasound R Median Nerve:  Median nerve cross-sectional area measuring 0.14 centimeters squared.    Diagnostic Ultrasound L Median Nerve:   Median nerve cross-sectional area measuring 0.12 centimeters squared.    Data Reviewed: Diagnostic Ultrasound     Supportive Actions: Independent visualization of images or test specimens    ASSESSMENT:   1. Bilateral carpal tunnel syndrome      PLAN:     1. Time was spent reviewing the above diagnosis in depth with Edmundo today, including acute management and rehabilitation.     2.  We discussed that his left lower extremity amputation and subsequent disability status does put extra stress and strain on the structures of the hands/wrists.  We discussed that he could be experiencing pain from both osteoarthritis, as well as carpal tunnel syndrome.  He is reporting some weakness and does have enlargement of the right median nerve on diagnostic ultrasound, with the left side being borderline.    3. He elected for bilateral carpal tunnel corticosteroid injections today under ultrasound guidance. See separate procedure note.    4. L Carpal Tunnel night splint provided to him today to trial.    5. RTC in 4-8 weeks if not improved.  At the time we may consider x-rays of the hands and/or EMG/nerve conduction study.    This is a consult from Lazarus Weinstein. Please see the "Communications" section of Epic to see how the consulting physician received the report of today's findings and recommendations. If it's an [facility] " "physician, it will be forwarded to his/her "in basket".    The above note was completed, in part, with the aid of Dragon dictation software/hardware. Translation errors may be present.    "

## 2020-07-07 NOTE — LETTER
July 7, 2020      [facility] P[facility]  [address] [facility] [address] [place] [address]           [facility]  [address] [facility] [address] [place] [address]  Phone: [phone]  Fax: [phone]          Patient: Edmundo Beyer   MR Number: [id number]   YOB: 1945   Date of Visit: 7/7/2020       Dear Lazarus Weinstein:    Thank you for referring Edmundo Beyer to me for evaluation. Attached you will find relevant portions of my assessment and plan of care.    If you have questions, please do not hesitate to call me. I look forward to following Edmundo Beyer along with you.    Sincerely,    Tyrel Zuñiga MD    Enclosure  CC:  No Recipients    If you would like to receive this communication electronically, please contact [email or web address] or [phone] to request more information on [employer] access.    For providers and/or their staff who would like to refer a patient to [facility], please contact us through our one-stop-shop provider referral line, [facility], at [phone].    If you feel you have received this communication in error or would no longer like to receive these types of communications, please e-mail [email or web address]

## 2020-07-08 NOTE — PROCEDURES
Carpal Tunnel: R intercarpal, L intercarpal    Date/Time: 7/7/2020 9:00 AM  Performed by: Tyrel Zuñiga MD  Authorized by: Tyrel Zuñiga MD     Consent Done?:  Yes (Verbal)  Indications:  Pain  Site marked: the procedure site was marked    Timeout: prior to procedure the correct patient, procedure, and site was verified    Prep: patient was prepped and draped in usual sterile fashion      Location:  Wrist  Site:  R intercarpal and L intercarpal  Ultrasonic Guidance for Needle Placement?: Yes    Needle gauge: 27g.  Approach: Needle in plane, radial to ulnar.  Medications:  6 mg betamethasone acetate-betamethasone sodium phosphate 6 mg/mL  Patient tolerance:  Patient tolerated the procedure well with no immediate complications     Additional Comments: Ultrasound guidance was used for correct needle placement, the images were saved will be uploaded to EMR.

## 2020-09-28 ENCOUNTER — OFFICE VISIT (OUTPATIENT)
Dept: PAIN MEDICINE | Facility: CLINIC | Age: 75
End: 2020-09-28
Payer: MEDICARE

## 2020-09-28 VITALS
SYSTOLIC BLOOD PRESSURE: 186 MMHG | DIASTOLIC BLOOD PRESSURE: 77 MMHG | HEIGHT: 71 IN | WEIGHT: 259.06 LBS | BODY MASS INDEX: 36.27 KG/M2 | HEART RATE: 55 BPM

## 2020-09-28 DIAGNOSIS — M48.02 SPINAL STENOSIS, CERVICAL REGION: ICD-10-CM

## 2020-09-28 DIAGNOSIS — G89.4 CHRONIC PAIN SYNDROME: Primary | ICD-10-CM

## 2020-09-28 DIAGNOSIS — M96.1 POSTLAMINECTOMY SYNDROME OF LUMBAR REGION: ICD-10-CM

## 2020-09-28 DIAGNOSIS — M54.12 CERVICAL RADICULOPATHY: ICD-10-CM

## 2020-09-28 DIAGNOSIS — G54.6 PHANTOM LIMB PAIN: ICD-10-CM

## 2020-09-28 PROCEDURE — 3077F PR MOST RECENT SYSTOLIC BLOOD PRESSURE >= 140 MM HG: ICD-10-PCS | Mod: CPTII,S$GLB,, | Performed by: ANESTHESIOLOGY

## 2020-09-28 PROCEDURE — 3078F PR MOST RECENT DIASTOLIC BLOOD PRESSURE < 80 MM HG: ICD-10-PCS | Mod: CPTII,S$GLB,, | Performed by: ANESTHESIOLOGY

## 2020-09-28 PROCEDURE — 3077F SYST BP >= 140 MM HG: CPT | Mod: CPTII,S$GLB,, | Performed by: ANESTHESIOLOGY

## 2020-09-28 PROCEDURE — 99213 OFFICE O/P EST LOW 20 MIN: CPT | Mod: S$GLB,,, | Performed by: ANESTHESIOLOGY

## 2020-09-28 PROCEDURE — 99213 PR OFFICE/OUTPT VISIT, EST, LEVL III, 20-29 MIN: ICD-10-PCS | Mod: S$GLB,,, | Performed by: ANESTHESIOLOGY

## 2020-09-28 PROCEDURE — 3288F PR FALLS RISK ASSESSMENT DOCUMENTED: ICD-10-PCS | Mod: CPTII,S$GLB,, | Performed by: ANESTHESIOLOGY

## 2020-09-28 PROCEDURE — 1125F AMNT PAIN NOTED PAIN PRSNT: CPT | Mod: S$GLB,,, | Performed by: ANESTHESIOLOGY

## 2020-09-28 PROCEDURE — 1100F PR PT FALLS ASSESS DOC 2+ FALLS/FALL W/INJURY/YR: ICD-10-PCS | Mod: CPTII,S$GLB,, | Performed by: ANESTHESIOLOGY

## 2020-09-28 PROCEDURE — 3008F PR BODY MASS INDEX (BMI) DOCUMENTED: ICD-10-PCS | Mod: CPTII,S$GLB,, | Performed by: ANESTHESIOLOGY

## 2020-09-28 PROCEDURE — 1100F PTFALLS ASSESS-DOCD GE2>/YR: CPT | Mod: CPTII,S$GLB,, | Performed by: ANESTHESIOLOGY

## 2020-09-28 PROCEDURE — 3288F FALL RISK ASSESSMENT DOCD: CPT | Mod: CPTII,S$GLB,, | Performed by: ANESTHESIOLOGY

## 2020-09-28 PROCEDURE — 1159F PR MEDICATION LIST DOCUMENTED IN MEDICAL RECORD: ICD-10-PCS | Mod: S$GLB,,, | Performed by: ANESTHESIOLOGY

## 2020-09-28 PROCEDURE — 3008F BODY MASS INDEX DOCD: CPT | Mod: CPTII,S$GLB,, | Performed by: ANESTHESIOLOGY

## 2020-09-28 PROCEDURE — 3078F DIAST BP <80 MM HG: CPT | Mod: CPTII,S$GLB,, | Performed by: ANESTHESIOLOGY

## 2020-09-28 PROCEDURE — 99999 PR PBB SHADOW E&M-EST. PATIENT-LVL IV: CPT | Mod: PBBFAC,,, | Performed by: ANESTHESIOLOGY

## 2020-09-28 PROCEDURE — 99999 PR PBB SHADOW E&M-EST. PATIENT-LVL IV: ICD-10-PCS | Mod: PBBFAC,,, | Performed by: ANESTHESIOLOGY

## 2020-09-28 PROCEDURE — 1125F PR PAIN SEVERITY QUANTIFIED, PAIN PRESENT: ICD-10-PCS | Mod: S$GLB,,, | Performed by: ANESTHESIOLOGY

## 2020-09-28 PROCEDURE — 1159F MED LIST DOCD IN RCRD: CPT | Mod: S$GLB,,, | Performed by: ANESTHESIOLOGY

## 2020-09-28 RX ORDER — OXYCODONE AND ACETAMINOPHEN 10; 325 MG/1; MG/1
1 TABLET ORAL EVERY 8 HOURS PRN
Qty: 90 TABLET | Refills: 0 | Status: SHIPPED | OUTPATIENT
Start: 2020-11-27 | End: 2020-12-08 | Stop reason: SDUPTHER

## 2020-09-28 RX ORDER — FENTANYL 50 UG/1
1 PATCH TRANSDERMAL
Qty: 10 PATCH | Refills: 0 | Status: SHIPPED | OUTPATIENT
Start: 2020-09-28 | End: 2020-10-28

## 2020-09-28 RX ORDER — OXYCODONE AND ACETAMINOPHEN 10; 325 MG/1; MG/1
1 TABLET ORAL EVERY 8 HOURS PRN
Qty: 90 TABLET | Refills: 0 | Status: SHIPPED | OUTPATIENT
Start: 2020-09-28 | End: 2020-10-28

## 2020-09-28 RX ORDER — FENTANYL 50 UG/1
1 PATCH TRANSDERMAL
Qty: 10 PATCH | Refills: 0 | Status: SHIPPED | OUTPATIENT
Start: 2020-10-28 | End: 2020-11-27

## 2020-09-28 RX ORDER — OXYCODONE AND ACETAMINOPHEN 10; 325 MG/1; MG/1
1 TABLET ORAL EVERY 8 HOURS PRN
Qty: 90 TABLET | Refills: 0 | Status: SHIPPED | OUTPATIENT
Start: 2020-10-28 | End: 2020-11-27

## 2020-09-28 RX ORDER — FENTANYL 50 UG/1
1 PATCH TRANSDERMAL
Qty: 10 PATCH | Refills: 0 | Status: SHIPPED | OUTPATIENT
Start: 2020-11-27 | End: 2020-12-08 | Stop reason: SDUPTHER

## 2020-09-28 NOTE — PROGRESS NOTES
This note was completed with dictation software and grammatical errors may exist.    CC:  Back pain    HPI:  The patient is a 75-year-old man with a history of bladder CA status post cystectomy also requiring left leg amputation, chronic low back pain with multiple lumbar surgeries who presents in referral from [facility] for continued pain.   He returns in follow-up today for chronic back and leg pain, phantom limb pain.  Since we had last seen him, he has seen Dr. Zuñiga for bilateral hand pain and underwent bilateral carpal tunnel steroid injections in July, 2020. He states that his hands are doing much better now, he is continuing to wear a left hand brace and feels that this works very well.  His main complaint however today is bilateral arm pain, starts from the shoulders and radiates into the biceps and into the posterior forearms.  He states that it feels like pins and needles, aching.  He feels that it is constant but especially worse when trying to lay down at night.  He does have a history of cervical surgery, denies any major neck pain at this point.  He continues to have low back pain, radiating into the right leg, feels that it is burning and tingling, feels like it is needles sticking him.  He feels that his pain medication does not seem to last more than a couple hours, he requested to increase this either in strength or frequency today, we discussed this as noted below.      Previous history:  The patient has had a total of 4 back surgeries with fusions, laminectomy is, continued right leg radicular pain throughout the entire extremity and foot.  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  In the past year he had a revision of his right knee replacement but had an infection and required a spacer being placed, long-term antibiotics.  He has been recovering from this and has consulted with Neurosurgery about his back and leg pain and they do not  feel that he is a surgical candidate at this point.  His pain is located across the bilateral buttocks and throughout the entire right leg.  He describes it as aching, burning, grabbing, sharp and electric.  It is worse with prolonged sitting, standing, lying down or getting out of a bed or a chair.  He does get some relief with medications. He has been taking Percocet 10/325 up to 3 times a day with about 75% relief in addition to fentanyl 50 mcg/hour.  He denies any new fevers or chills, no new weakness.    Pain intervention history:  He had tried multiple injections in the past, the last time was over 10 years ago.  He has tried gabapentin in the past but had side effects.  He has not tried  Topamax(has a history of renal calculi).He has been taking Percocet 10/325 up to 3 times a day with about 75% relief in addition to fentanyl 50 mcg/hour.He had tried using the Lyrica 50 mg nightly for a week and states that he has had jerking of his extremities which is the same thing that happened with gabapentin but not as severe.      Lab Results   Component Value Date    HGBA1C 5.5 01/03/2019         ROS:  He reports fatigability, weight gain, itching, shortness of breath, constipation, easy bruising, bleeding, history of kidney stones, joint stiffness, joint swelling, back pain, difficulty sleeping, depression and loss of balance.  Balance of review of systems is negative.    Past Medical History:   Diagnosis Date    Arthritis     Cancer 2012    left leg amputated, bladder     Encounter for blood transfusion     Hypertension     Presence of urostomy     Sleep apnea with use of continuous positive airway pressure (CPAP)     Wheelchair dependent        Past Surgical History:   Procedure Laterality Date    BACK SURGERY      4 surgeries    CARDIOVERSION      x 2    CARPAL TUNNEL RELEASE Bilateral     COLON SURGERY      COLONOSCOPY  2014    COLOSTOMY      COLOSTOMY CLOSURE      JOINT REPLACEMENT Right 2007     TKA    JOINT REPLACEMENT Right 2018    Spacer exchange TKA/lateral release/Exc bony overgrowth patella    JOINT REPLACEMENT Right 2019    Stage 2 revision, all 3 components/Open lateral release.    LEG AMPUTATION AT HIP Left 2012    MEDIPORT REMOVAL Left 2018    Procedure: REMOVAL, CATHETER, CENTRAL VENOUS, TUNNELED, WITH PORT;  Surgeon: Stu Godinez MD;  Location: [facility];  Service: General;  Laterality: Left;    NECK SURGERY      2 surgeries    PORTACATH PLACEMENT      REVISION OF KNEE ARTHROPLASTY Right 2018    Procedure: REVISION, ARTHROPLASTY, KNEE-stage one;  Surgeon: Dillon Peacock MD;  Location: [facility] OR;  Service: Orthopedics;  Laterality: Right;    REVISION OF KNEE ARTHROPLASTY Right 2019    Procedure: REVISION, ARTHROPLASTY, KNEE STAGE 2-OPEN LATERAL RELEASE, WITH REMOVAL OF IMPLANTS.;  Surgeon: Dillon Peacock MD;  Location: [facility];  Service: Orthopedics;  Laterality: Right;    TOTAL KNEE ARTHROPLASTY Right        Social History     Socioeconomic History    Marital status:      Spouse name: Not on file    Number of children: Not on file    Years of education: Not on file    Highest education level: Not on file   Occupational History    Not on file   Social Needs    Financial resource strain: Not on file    Food insecurity     Worry: Not on file     Inability: Not on file    Transportation needs     Medical: Not on file     Non-medical: Not on file   Tobacco Use    Smoking status: Former Smoker     Packs/day: 1.00     Types: Cigarettes     Quit date: 2000     Years since quittin.7    Smokeless tobacco: Former User    Tobacco comment: quit 18 years ago    Substance and Sexual Activity    Alcohol use: No     Frequency: Never    Drug use: No    Sexual activity: Never   Lifestyle    Physical activity     Days per week: Not on file     Minutes per session: Not on file    Stress: Not on file   Relationships    Social connections      "Talks on phone: Not on file     Gets together: Not on file     Attends Worship service: Not on file     Active member of club or organization: Not on file     Attends meetings of clubs or organizations: Not on file     Relationship status: Not on file   Other Topics Concern    Not on file   Social History Narrative    Not on file         Medications/Allergies: See med card    Vitals:    09/28/20 0903   BP: (!) 186/77   Pulse: (!) 55   Weight: 117.5 kg (259 lb 0.7 oz)   Height: 5' 11" (1.803 m)   PainSc:   5   PainLoc: Back         Physical exam:  Gen: A and O x3, pleasant, well-groomed  Skin: No rashes or obvious lesions  HEENT: PERRLA, no obvious deformities on ears or in canals. Trachea midline.  CVS: Regular rate and rhythm, normal palpable pulses.  Resp:No increased work of breathing, symmetrical chest rise.  Abdomen: Soft, NT/ND.  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     Neuro:  Lower extremities: 5/5 strength right side  Reflexes: Patellar 0+, Achilles 0+ right side  Sensory:  Right leg: Intact and symmetrical to light touch and pinprick in L2-S1 dermatomes with some decreased sensation to light touch and pinprick throughout the entire leg.    Neuro:  Upper extremities: 5/5 strength bilaterally   Reflexes: Brachioradialis 2+, Bicep 2+, Tricep 2+.   Sensory: Intact and symmetrical to light touch and pinprick in C2-T1 dermatomes bilaterally.    Cervical Spine:  Cervical spine: ROM is full in flexion, extension and lateral rotation without increased pain.  Spurling's maneuver causes no neck pain to either side.  Myofascial exam: No Tenderness to palpation across cervical paraspinous region bilaterally.       Lumbar spine:  Lumbar spine:  Range of motion not tested, unable to stand without assistance.  Chico's test causes no increased pain on either side.    Supine straight leg raise is negative on the right side.  Internal and external rotation of the hip causes no " increased pain on either side.  Myofascial exam: No tenderness to palpation across lumbar paraspinous muscles.    Tinel's test positive on the left and Phalen's positive on the right.    Imagin19 Xray Thoracolumbar spine:  Osseous demineralization is noted diffusely.  L1, L2, L3, and L4 posterior pedicle screw fusion is suspected with intervertebral disc prostheses at the L2-L3 and L3-L4 levels.  Intervertebral disc height loss is noted at all lumbar levels.  Vertebral body heights are suboptimally evaluated secondary to poor penetration on the lateral likely secondary to body habitus.  Atherosclerotic calcifications are noted within the aorta.       Assessment:   The patient is a 75-year-old man with a history of bladder CA status post cystectomy also requiring left leg amputation, chronic low back pain with multiple lumbar surgeries who presents in referral from [facility] for continued pain.    1. Chronic pain syndrome     2. Spinal stenosis, cervical region  MRI Cervical Spine Without Contrast    MRI Cervical Spine Without Contrast   3. Postlaminectomy syndrome of lumbar region     4. Phantom limb pain     5. Cervical radiculopathy         Plan:  1.  For his neck pain, bilateral arm pain I am going to get a new cervical spine MRI and we may consider procedural interventions such as an epidural steroid injection to help with this pain.  He had asked about increasing his medication but we had a discussion about opioid hyperalgesia and that I suspect that his pain is only going to worsen if we continue increasing this.  He understands and does not want to do this.  2.  For his low back and leg pain, I think spinal cord stimulation could be an option but I had wanted to avoid this based on his history of infections in the past.  We could consider this if at some point medications are no longer helping him whatsoever but I would probably like to try to avoid this if we could.  3.  I have  refilled his Percocet and fentanyl patch.  I will have him follow up in 3 months or sooner as needed.  I will call him with the results of the cervical MRI and we may set up procedures based on this. [place] [employer] website checked and no aberrant patterns noted.

## 2020-10-02 ENCOUNTER — TELEPHONE (OUTPATIENT)
Dept: PAIN MEDICINE | Facility: CLINIC | Age: 75
End: 2020-10-02

## 2020-10-02 NOTE — TELEPHONE ENCOUNTER
----- Message from Martha Jaffe sent at 10/2/2020  2:38 PM CDT -----  Contact: pt  Type: Needs Medical Advice    Who Called:  [facility]  Best Call Back Number: [phone] fax [phone]  Additional Information: Requesting a call back regarding PA for MRi of pt cervial spine   Please Advise ---Thank you

## 2020-10-02 NOTE — TELEPHONE ENCOUNTER
----- Message from Paige Omalley MA sent at 10/2/2020  9:09 AM CDT -----  Type: Needs Medical Advice  Who Called:  [facility]  Best Call Back Number:   Additional Information: patient scheduled for MRI, insurance requires PA

## 2020-10-15 ENCOUNTER — TELEPHONE (OUTPATIENT)
Dept: PAIN MEDICINE | Facility: CLINIC | Age: 75
End: 2020-10-15

## 2020-10-15 NOTE — TELEPHONE ENCOUNTER
----- Message from eJns Hendrickson sent at 10/15/2020  2:22 PM CDT -----  Regarding: results  Contact: self  Type:  Test Results    Who Called:  self   Name of Test (Lab/Mammo/Etc):  Mri of the neck  Date of Test:  a week ago  Ordering Provider:    Where the test was performed:  On [facility] Call Back Number:  [phone]   Additional Information:

## 2020-10-27 ENCOUNTER — TELEPHONE (OUTPATIENT)
Dept: PAIN MEDICINE | Facility: CLINIC | Age: 75
End: 2020-10-27

## 2020-10-27 NOTE — TELEPHONE ENCOUNTER
----- Message from Martha Jaffe sent at 10/27/2020  9:07 AM CDT -----  Contact: Pt  Type: Needs Medical Advice    Who Called:  Pt  Best Call Back Number: [phone]  Additional Information: Requesting a call back regarding pt requesting call back about his appt on Thursday  Please Advise ---Thank you

## 2020-10-29 ENCOUNTER — TELEPHONE (OUTPATIENT)
Dept: PAIN MEDICINE | Facility: CLINIC | Age: 75
End: 2020-10-29

## 2020-10-29 DIAGNOSIS — M54.12 CERVICAL RADICULOPATHY: Primary | ICD-10-CM

## 2020-10-29 DIAGNOSIS — Z11.9 SCREENING EXAMINATION FOR INFECTIOUS DISEASE: ICD-10-CM

## 2020-10-29 RX ORDER — SODIUM CHLORIDE, SODIUM LACTATE, POTASSIUM CHLORIDE, CALCIUM CHLORIDE 600; 310; 30; 20 MG/100ML; MG/100ML; MG/100ML; MG/100ML
INJECTION, SOLUTION INTRAVENOUS CONTINUOUS
Status: CANCELLED | OUTPATIENT
Start: 2020-11-17

## 2020-10-29 NOTE — TELEPHONE ENCOUNTER
Please let the patient know that I reviewed his cervical spine MRI and he has a significant degree of narrowing where the nerves exit and go down his arms at the level above and below his prior fusion.  This is likely what is causing his bilateral arm pain.  Please schedule him for a C7-T1 interlaminar epidural steroid injection with 4 week follow-up.

## 2020-10-29 NOTE — TELEPHONE ENCOUNTER
----- Message from Concetta Ochoa sent at 10/29/2020  2:24 PM CDT -----  Regarding: rt call  Type:  Patient Returning Call    Who Called:  pt  Who Left Message for Patient:  Carolin  Does the patient know what this is regarding?: results  Best Call Back Number:  [phone] (home)     Additional Information:  na

## 2020-11-14 ENCOUNTER — LAB VISIT (OUTPATIENT)
Dept: FAMILY MEDICINE | Facility: CLINIC | Age: 75
End: 2020-11-14
Payer: MEDICARE

## 2020-11-14 DIAGNOSIS — Z11.9 SCREENING EXAMINATION FOR INFECTIOUS DISEASE: ICD-10-CM

## 2020-11-14 PROCEDURE — U0003 INFECTIOUS AGENT DETECTION BY NUCLEIC ACID (DNA OR RNA); SEVERE ACUTE RESPIRATORY SYNDROME CORONAVIRUS 2 (SARS-COV-2) (CORONAVIRUS DISEASE [COVID-19]), AMPLIFIED PROBE TECHNIQUE, MAKING USE OF HIGH THROUGHPUT TECHNOLOGIES AS DESCRIBED BY CMS-2020-01-R: HCPCS

## 2020-11-15 LAB — SARS-COV-2 RNA RESP QL NAA+PROBE: NOT DETECTED

## 2020-11-17 ENCOUNTER — HOSPITAL ENCOUNTER (OUTPATIENT)
Dept: RADIOLOGY | Facility: HOSPITAL | Age: 75
Discharge: HOME | End: 2020-11-17
Attending: ANESTHESIOLOGY | Admitting: ANESTHESIOLOGY
Payer: MEDICARE

## 2020-11-17 ENCOUNTER — HOSPITAL ENCOUNTER (OUTPATIENT)
Facility: HOSPITAL | Age: 75
Discharge: HOME | End: 2020-11-17
Attending: ANESTHESIOLOGY | Admitting: ANESTHESIOLOGY
Payer: MEDICARE

## 2020-11-17 DIAGNOSIS — G89.4 CHRONIC PAIN SYNDROME: ICD-10-CM

## 2020-11-17 DIAGNOSIS — M54.12 CERVICAL RADICULOPATHY: Primary | ICD-10-CM

## 2020-11-17 PROCEDURE — 25000003 PHARM REV CODE 250: Mod: PO | Performed by: ANESTHESIOLOGY

## 2020-11-17 PROCEDURE — 76000 FLUOROSCOPY <1 HR PHYS/QHP: CPT | Mod: TC,PO

## 2020-11-17 PROCEDURE — 62321 NJX INTERLAMINAR CRV/THRC: CPT | Mod: PO | Performed by: ANESTHESIOLOGY

## 2020-11-17 PROCEDURE — A4216 STERILE WATER/SALINE, 10 ML: HCPCS | Mod: PO | Performed by: ANESTHESIOLOGY

## 2020-11-17 PROCEDURE — 63600175 PHARM REV CODE 636 W HCPCS: Mod: PO | Performed by: ANESTHESIOLOGY

## 2020-11-17 PROCEDURE — 62321 NJX INTERLAMINAR CRV/THRC: CPT | Mod: ,,, | Performed by: ANESTHESIOLOGY

## 2020-11-17 PROCEDURE — 25500020 PHARM REV CODE 255: Mod: PO | Performed by: ANESTHESIOLOGY

## 2020-11-17 PROCEDURE — 62321 PR INJ CERV/THORAC, W/GUIDANCE: ICD-10-PCS | Mod: ,,, | Performed by: ANESTHESIOLOGY

## 2020-11-17 RX ORDER — MIDAZOLAM HYDROCHLORIDE 2 MG/2ML
INJECTION, SOLUTION INTRAMUSCULAR; INTRAVENOUS
Status: DISCONTINUED | OUTPATIENT
Start: 2020-11-17 | End: 2020-11-17 | Stop reason: HOSPADM

## 2020-11-17 RX ORDER — SODIUM CHLORIDE, SODIUM LACTATE, POTASSIUM CHLORIDE, CALCIUM CHLORIDE 600; 310; 30; 20 MG/100ML; MG/100ML; MG/100ML; MG/100ML
INJECTION, SOLUTION INTRAVENOUS CONTINUOUS
Status: DISCONTINUED | OUTPATIENT
Start: 2020-11-17 | End: 2020-11-17 | Stop reason: HOSPADM

## 2020-11-17 RX ORDER — METHYLPREDNISOLONE ACETATE 80 MG/ML
INJECTION, SUSPENSION INTRA-ARTICULAR; INTRALESIONAL; INTRAMUSCULAR; SOFT TISSUE
Status: DISCONTINUED | OUTPATIENT
Start: 2020-11-17 | End: 2020-11-17 | Stop reason: HOSPADM

## 2020-11-17 RX ORDER — SODIUM CHLORIDE 9 MG/ML
INJECTION, SOLUTION INTRAMUSCULAR; INTRAVENOUS; SUBCUTANEOUS
Status: DISCONTINUED | OUTPATIENT
Start: 2020-11-17 | End: 2020-11-17 | Stop reason: HOSPADM

## 2020-11-17 RX ORDER — LIDOCAINE HYDROCHLORIDE 10 MG/ML
1 INJECTION INFILTRATION; PERINEURAL ONCE
Status: COMPLETED | OUTPATIENT
Start: 2020-11-17 | End: 2020-11-17

## 2020-11-17 RX ADMIN — LIDOCAINE HYDROCHLORIDE 1 ML: 10 INJECTION, SOLUTION EPIDURAL; INFILTRATION; INTRACAUDAL; PERINEURAL at 02:11

## 2020-11-17 RX ADMIN — SODIUM CHLORIDE, SODIUM LACTATE, POTASSIUM CHLORIDE, AND CALCIUM CHLORIDE: .6; .31; .03; .02 INJECTION, SOLUTION INTRAVENOUS at 02:11

## 2020-11-17 NOTE — H&P
CC: Neck pain    HPI: The patient is a 76yo man with a history of cervical radiculopathy here for cervical ESDRAS. There are no major changes in history and physical from 9/28/20.    Past Medical History:   Diagnosis Date    Arthritis     Cancer 2012    left leg amputated, bladder     Encounter for blood transfusion     Hypertension     Presence of urostomy     Sleep apnea with use of continuous positive airway pressure (CPAP)     Wheelchair dependent        Past Surgical History:   Procedure Laterality Date    BACK SURGERY      4 surgeries    CARDIOVERSION      x 2    CARPAL TUNNEL RELEASE Bilateral     COLON SURGERY      COLONOSCOPY  2014    COLOSTOMY      COLOSTOMY CLOSURE      JOINT REPLACEMENT Right 2007    TKA    JOINT REPLACEMENT Right 02/21/2018    Spacer exchange TKA/lateral release/Exc bony overgrowth patella    JOINT REPLACEMENT Right 01/05/2019    Stage 2 revision, all 3 components/Open lateral release.    LEG AMPUTATION AT HIP Left 09/20/2012    MEDIPORT REMOVAL Left 8/30/2018    Procedure: REMOVAL, CATHETER, CENTRAL VENOUS, TUNNELED, WITH PORT;  Surgeon: Stu Godinez MD;  Location: [facility] OR;  Service: General;  Laterality: Left;    NECK SURGERY      2 surgeries    PORTACATH PLACEMENT      REVISION OF KNEE ARTHROPLASTY Right 6/26/2018    Procedure: REVISION, ARTHROPLASTY, KNEE-stage one;  Surgeon: Dillon Peacock MD;  Location: [facility] OR;  Service: Orthopedics;  Laterality: Right;    REVISION OF KNEE ARTHROPLASTY Right 1/5/2019    Procedure: REVISION, ARTHROPLASTY, KNEE STAGE 2-OPEN LATERAL RELEASE, WITH REMOVAL OF IMPLANTS.;  Surgeon: Dillon Peacock MD;  Location: [facility] OR;  Service: Orthopedics;  Laterality: Right;    TOTAL KNEE ARTHROPLASTY Right        Family History   Adopted: Yes       Social History     Socioeconomic History    Marital status:      Spouse name: Not on file    Number of children: Not on file    Years of education: Not on file    Highest education  "level: Not on file   Occupational History    Not on file   Social Needs    Financial resource strain: Not on file    Food insecurity     Worry: Not on file     Inability: Not on file    Transportation needs     Medical: Not on file     Non-medical: Not on file   Tobacco Use    Smoking status: Former Smoker     Packs/day: 1.00     Types: Cigarettes     Quit date: 2000     Years since quittin.8    Smokeless tobacco: Former User    Tobacco comment: quit 18 years ago    Substance and Sexual Activity    Alcohol use: No     Frequency: Never    Drug use: No    Sexual activity: Never   Lifestyle    Physical activity     Days per week: Not on file     Minutes per session: Not on file    Stress: Not on file   Relationships    Social connections     Talks on phone: Not on file     Gets together: Not on file     Attends Voodoo service: Not on file     Active member of club or organization: Not on file     Attends meetings of clubs or organizations: Not on file     Relationship status: Not on file   Other Topics Concern    Not on file   Social History Narrative    Not on file       Current Facility-Administered Medications   Medication Dose Route Frequency Provider Last Rate Last Dose    lactated ringers infusion   Intravenous Continuous Felipe Renae MD           Review of patient's allergies indicates:   Allergen Reactions    Hydromorphone Itching    Morphine Itching       Vitals:    20 1723 20 1331 20 1342   BP:  (!) 181/76 (!) 181/76   Pulse:  66    Resp:  18    Temp:  97 °F (36.1 °C)    TempSrc:  Skin    SpO2:  99%    Weight: 129.3 kg (285 lb)     Height: 5' 11" (1.803 m)       ASA 3, mallampati 2      REVIEW OF SYSTEMS:     GENERAL: No weight loss, malaise or fevers.  HEENT:  No recent changes in vision or hearing  NECK: Negative for lumps, no difficulty with swallowing.  RESPIRATORY: Negative for cough, wheezing or shortness of breath, patient denies any recent " URI.  CARDIOVASCULAR: Negative for chest pain, leg swelling or palpitations.  GI: Negative for abdominal discomfort, blood in stools or black stools or change in bowel habits.  MUSCULOSKELETAL: See HPI.  SKIN: Negative for lesions, rash, and itching.  PSYCH: No suicidal or homicidal ideations, no current mood disturbances.  HEMATOLOGY/LYMPHOLOGY: Negative for prolonged bleeding, bruising easily or swollen nodes. Patient is not currently taking any anti-coagulants  ENDO: No history of diabetes or thyroid dysfunction  NEURO: No history of syncope, paralysis, seizures or tremors.All other reviewed and negative other than HPI.    Physical exam:  Gen: A and O x3, pleasant, well-groomed  Skin: No rashes or obvious lesions  HEENT: PERRLA, no obvious deformities on ears or in canals. No thyroid masses, trachea midline, no palpable lymph nodes in neck, axilla.  CVS: Regular rate and rhythm, normal S1 and S2, no murmurs.  Resp: Clear to auscultation bilaterally.  Abdomen: Soft, NT/ND, normal bowel sounds present.  Musculoskeletal/Neuro: Moving all extremities    Assessment:  Cervical radiculopathy  -     Case Request Operating Room: Injection-steroid-epidural-cervical, C7-T1  -     Place in Outpatient; Standing  -     Diet NPO; Standing  -     lactated ringers infusion  -     Notify physician ; Standing  -     Notify physician ; Standing  -     Notify physician (specify); Standing  -     Place 18-22 gauage peripheral IV ; Standing  -     Verify informed consent; Standing  -     Vital signs; Standing    Other orders  -     Progressive Mobility Protocol (mobilize patient to their highest level of functioning at least twice daily); Standing  -     IP VTE LOW RISK PATIENT; Standing

## 2020-11-17 NOTE — DISCHARGE SUMMARY
[facility]  Discharge Note  Short Stay    Procedure(s) (LRB):  Injection-steroid-epidural-cervical, C7-T1 (N/A)    OUTCOME: Patient tolerated treatment/procedure well without complication and is now ready for discharge.    DISPOSITION: Home or Self Care    FINAL DIAGNOSIS:  Cervical radiculopathy    FOLLOWUP: In clinic    DISCHARGE INSTRUCTIONS:    Discharge Procedure Orders   Diet Adult Regular     Notify your health care provider if you experience any of the following:  temperature >100.4     No dressing needed     Activity as tolerated

## 2020-11-17 NOTE — OP NOTE
PROCEDURE DATE: 11/17/2020    Procedure: C7-T1 cervical interlaminar epidural steroid injection under utilizing fluoroscopy.    Diagnosis: Cervical Radiculopathy    POSTOP DIAGNOSIS: SAME    Physician: Felipe Renae MD    Medications injected:  Methylprednisone 80mg followed by a slow injection of 4 mL sterile, preservative-free normal saline.    Local anesthetic used: Lidocaine 1%, 4 ml.    Sedation Medications: 2mg versed    Complications:  none    Estimated blood loss: none    Technique:  A time-out was taken to identify patient and procedure prior to starting the procedure.  With the patient laying in a prone position with the neck in a mid-flexed forward position, the area was prepped and draped in the usual sterile fashion using ChloraPrep and a fenestrated drape.  The area was determined under AP fluoroscopic guidance.  Local anesthetic was given using a 25-gauge 1.5 inch needle by raising a wheal and then infiltrating ventrally.  A 6 inch 18-gauge Touhy needle was introduced under fluoroscopic guidance to meet the lamina of C7.  The needle was then hinged under the lamina then advanced using loss of resistance technique.  Once the tip of the needle was in the desired position, the contrast dye Omnipaque was injected to determine placement and no uptake.  The steroid was then injected slowly followed by a slow injection of 4 mL of the sterile preservative-free normal saline.  The patient tolerated the procedure well.    The patient was monitored after the procedure and was given post-procedure and discharge instructions to follow at home. The patient was discharged in a stable condition.    Event Time In   In Facility 1254   In Pre-Procedure 1312   Physician Available    Anesthesia Available    Pre-Op: Bedside Procedure Start    Pre-Op: Bedside Procedure Stop    Pre-Procedure Complete 1406   Out of Pre-Procedure    Anesthesia Start    Anesthesia Start Data Collection    Setup Start    Setup Complete    In  Room 1412   Prep Start    Procedure Prep Complete    Procedure Start 1435   Procedure Closing    Emergence    Procedure Finish 1437   Sedation Start 1411   Scope In    Extent Reached    Scope Out    Sedation End 1440   Out of Room 1440   Cleanup Start    Cleanup Complete    Cosmetic Start    Cosmetic Stop    Pain Mgmt In Room    Pain Mgmt Out Room    In Recovery    Anesthesia Finish    Bedside Procedure Start    Bedside Procedure Stop    Recovery Care Complete    Out of Recovery    To Phase II    In Phase II    Pain Mgmt Recovery Start    Pain Mgmt Recovery Stop    Obs Rec Start    Obs Rec Stop    Phase II Care Complete    Out of Phase II    Procedural Care Complete    Discharge    Pain Follow Up Needed    Pain Follow Up Complete      Moderate sedation was achieved with midazolam 2mg.  Continuous monitoring of EKG, blood pressure and pulse oximetry was provided by a registered nurse during the entire course of the procedure under my supervision and recorded in the patient's medical record.   Total time for sedation was 19 minutes.

## 2020-11-18 VITALS
BODY MASS INDEX: 39.9 KG/M2 | HEART RATE: 70 BPM | SYSTOLIC BLOOD PRESSURE: 156 MMHG | TEMPERATURE: 98 F | OXYGEN SATURATION: 98 % | DIASTOLIC BLOOD PRESSURE: 74 MMHG | RESPIRATION RATE: 16 BRPM | WEIGHT: 285 LBS | HEIGHT: 71 IN

## 2020-12-08 ENCOUNTER — TELEPHONE (OUTPATIENT)
Dept: PAIN MEDICINE | Facility: CLINIC | Age: 75
End: 2020-12-08

## 2020-12-08 ENCOUNTER — OFFICE VISIT (OUTPATIENT)
Dept: PAIN MEDICINE | Facility: CLINIC | Age: 75
End: 2020-12-08
Payer: MEDICARE

## 2020-12-08 VITALS
RESPIRATION RATE: 18 BRPM | DIASTOLIC BLOOD PRESSURE: 74 MMHG | OXYGEN SATURATION: 94 % | TEMPERATURE: 98 F | HEART RATE: 58 BPM | SYSTOLIC BLOOD PRESSURE: 177 MMHG

## 2020-12-08 DIAGNOSIS — M54.12 CERVICAL RADICULOPATHY: Primary | ICD-10-CM

## 2020-12-08 DIAGNOSIS — G54.6 PHANTOM LIMB PAIN: ICD-10-CM

## 2020-12-08 DIAGNOSIS — Z79.891 OPIOID USE AGREEMENT EXISTS: ICD-10-CM

## 2020-12-08 DIAGNOSIS — G89.4 CHRONIC PAIN SYNDROME: ICD-10-CM

## 2020-12-08 DIAGNOSIS — M96.1 POSTLAMINECTOMY SYNDROME OF LUMBAR REGION: ICD-10-CM

## 2020-12-08 PROCEDURE — 99213 PR OFFICE/OUTPT VISIT, EST, LEVL III, 20-29 MIN: ICD-10-PCS | Mod: S$GLB,,, | Performed by: PHYSICIAN ASSISTANT

## 2020-12-08 PROCEDURE — 99999 PR PBB SHADOW E&M-EST. PATIENT-LVL IV: ICD-10-PCS | Mod: PBBFAC,,, | Performed by: PHYSICIAN ASSISTANT

## 2020-12-08 PROCEDURE — 1125F PR PAIN SEVERITY QUANTIFIED, PAIN PRESENT: ICD-10-PCS | Mod: S$GLB,,, | Performed by: PHYSICIAN ASSISTANT

## 2020-12-08 PROCEDURE — 1101F PR PT FALLS ASSESS DOC 0-1 FALLS W/OUT INJ PAST YR: ICD-10-PCS | Mod: CPTII,S$GLB,, | Performed by: PHYSICIAN ASSISTANT

## 2020-12-08 PROCEDURE — 3077F SYST BP >= 140 MM HG: CPT | Mod: CPTII,S$GLB,, | Performed by: PHYSICIAN ASSISTANT

## 2020-12-08 PROCEDURE — 1101F PT FALLS ASSESS-DOCD LE1/YR: CPT | Mod: CPTII,S$GLB,, | Performed by: PHYSICIAN ASSISTANT

## 2020-12-08 PROCEDURE — 3078F DIAST BP <80 MM HG: CPT | Mod: CPTII,S$GLB,, | Performed by: PHYSICIAN ASSISTANT

## 2020-12-08 PROCEDURE — 3288F PR FALLS RISK ASSESSMENT DOCUMENTED: ICD-10-PCS | Mod: CPTII,S$GLB,, | Performed by: PHYSICIAN ASSISTANT

## 2020-12-08 PROCEDURE — 99213 OFFICE O/P EST LOW 20 MIN: CPT | Mod: S$GLB,,, | Performed by: PHYSICIAN ASSISTANT

## 2020-12-08 PROCEDURE — 1159F PR MEDICATION LIST DOCUMENTED IN MEDICAL RECORD: ICD-10-PCS | Mod: S$GLB,,, | Performed by: PHYSICIAN ASSISTANT

## 2020-12-08 PROCEDURE — 99999 PR PBB SHADOW E&M-EST. PATIENT-LVL IV: CPT | Mod: PBBFAC,,, | Performed by: PHYSICIAN ASSISTANT

## 2020-12-08 PROCEDURE — 3078F PR MOST RECENT DIASTOLIC BLOOD PRESSURE < 80 MM HG: ICD-10-PCS | Mod: CPTII,S$GLB,, | Performed by: PHYSICIAN ASSISTANT

## 2020-12-08 PROCEDURE — 3288F FALL RISK ASSESSMENT DOCD: CPT | Mod: CPTII,S$GLB,, | Performed by: PHYSICIAN ASSISTANT

## 2020-12-08 PROCEDURE — 3077F PR MOST RECENT SYSTOLIC BLOOD PRESSURE >= 140 MM HG: ICD-10-PCS | Mod: CPTII,S$GLB,, | Performed by: PHYSICIAN ASSISTANT

## 2020-12-08 PROCEDURE — 1159F MED LIST DOCD IN RCRD: CPT | Mod: S$GLB,,, | Performed by: PHYSICIAN ASSISTANT

## 2020-12-08 PROCEDURE — 1125F AMNT PAIN NOTED PAIN PRSNT: CPT | Mod: S$GLB,,, | Performed by: PHYSICIAN ASSISTANT

## 2020-12-08 RX ORDER — OXYCODONE AND ACETAMINOPHEN 10; 325 MG/1; MG/1
1 TABLET ORAL EVERY 8 HOURS PRN
Qty: 90 TABLET | Refills: 0 | Status: SHIPPED | OUTPATIENT
Start: 2020-12-27 | End: 2021-01-26

## 2020-12-08 RX ORDER — FENTANYL 50 UG/1
1 PATCH TRANSDERMAL
Qty: 10 PATCH | Refills: 0 | Status: SHIPPED | OUTPATIENT
Start: 2021-01-26 | End: 2021-02-25

## 2020-12-08 RX ORDER — OXYCODONE AND ACETAMINOPHEN 10; 325 MG/1; MG/1
1 TABLET ORAL EVERY 8 HOURS PRN
Qty: 90 TABLET | Refills: 0 | Status: SHIPPED | OUTPATIENT
Start: 2021-01-26 | End: 2021-02-25

## 2020-12-08 RX ORDER — FENTANYL 50 UG/1
1 PATCH TRANSDERMAL
Qty: 10 PATCH | Refills: 0 | Status: SHIPPED | OUTPATIENT
Start: 2021-02-25 | End: 2021-03-09 | Stop reason: SDUPTHER

## 2020-12-08 RX ORDER — FENTANYL 50 UG/1
1 PATCH TRANSDERMAL
Qty: 10 PATCH | Refills: 0 | Status: SHIPPED | OUTPATIENT
Start: 2020-12-27 | End: 2021-01-26

## 2020-12-08 RX ORDER — OXYCODONE AND ACETAMINOPHEN 10; 325 MG/1; MG/1
1 TABLET ORAL EVERY 8 HOURS PRN
Qty: 90 TABLET | Refills: 0 | Status: SHIPPED | OUTPATIENT
Start: 2021-02-25 | End: 2021-03-09 | Stop reason: SDUPTHER

## 2020-12-08 NOTE — TELEPHONE ENCOUNTER
----- Message from Hanna Ibanez sent at 12/8/2020 11:05 AM CST -----  Contact: patient  Type: Needs Medical Advice  Who Called:  patient  Symptoms (please be specific):  monster  How long has patient had these symptoms:  monster  Pharmacy name and phone #:  [facility]  Best Call Back Number: [phone]  Additional Information: patient states he thinks he left his insurance card in office.  Please call to advise.  Thanks!

## 2020-12-14 NOTE — PROGRESS NOTES
This note was completed with dictation software and grammatical errors may exist.    CC:  Back pain, neck pain, phantom limb pain    HPI:  The patient is a 75-year-old man with a history of bladder CA status post cystectomy also requiring left leg amputation, chronic low back pain with multiple lumbar surgeries who presents in referral [facility] for continued pain.   He is status post C7-T1 interlaminar epidural steroid injection on 11/17/2020 with 50% relief.  He is pleased with these results.  He continues to have some neck pain but no longer has pain radiating into his arms.  He continues to have bilateral buttock pain as well as phantom limb pain.  He feels that is long as he takes his medication this is currently tolerable for him.  He denies any new weakness or new numbness.    Previous history:  The patient has had a total of 4 back surgeries with fusions, laminectomy is, continued right leg radicular pain throughout the entire extremity and foot.  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  In the past year he had a revision of his right knee replacement but had an infection and required a spacer being placed, long-term antibiotics.  He has been recovering from this and has consulted with Neurosurgery about his back and leg pain and they do not feel that he is a surgical candidate at this point.  His pain is located across the bilateral buttocks and throughout the entire right leg.  He describes it as aching, burning, grabbing, sharp and electric.  It is worse with prolonged sitting, standing, lying down or getting out of a bed or a chair.  He does get some relief with medications. He has been taking Percocet 10/325 up to 3 times a day with about 75% relief in addition to fentanyl 50 mcg/hour.  He denies any new fevers or chills, no new weakness.    Pain intervention history:  He had tried multiple injections in the past, the last time was over 10  years ago.  He has tried gabapentin in the past but had side effects.  He has not tried  Topamax(has a history of renal calculi).He has been taking Percocet 10/325 up to 3 times a day with about 75% relief in addition to fentanyl 50 mcg/hour.He had tried using the Lyrica 50 mg nightly for a week and states that he has had jerking of his extremities which is the same thing that happened with gabapentin but not as severe.   He is status post C7-T1 interlaminar epidural steroid injection on 11/17/2020 with 50% relief.        Lab Results   Component Value Date    HGBA1C 5.5 01/03/2019         ROS:  He reports fatigability, weight gain, itching, shortness of breath, constipation, easy bruising, bleeding, history of kidney stones, joint stiffness, joint swelling, back pain, difficulty sleeping, depression and loss of balance.  Balance of review of systems is negative.    Past Medical History:   Diagnosis Date    Arthritis     Cancer 2012    left leg amputated, bladder     Encounter for blood transfusion     Hypertension     Presence of urostomy     Sleep apnea with use of continuous positive airway pressure (CPAP)     Wheelchair dependent        Past Surgical History:   Procedure Laterality Date    BACK SURGERY      4 surgeries    CARDIOVERSION      x 2    CARPAL TUNNEL RELEASE Bilateral     COLON SURGERY      COLONOSCOPY  2014    COLOSTOMY      COLOSTOMY CLOSURE      EPIDURAL STEROID INJECTION INTO CERVICAL SPINE N/A 11/17/2020    Procedure: Injection-steroid-epidural-cervical, C7-T1;  Surgeon: Felipe Renae MD;  Location: [facility];  Service: Pain Management;  Laterality: N/A;    JOINT REPLACEMENT Right 2007    TKA    JOINT REPLACEMENT Right 02/21/2018    Spacer exchange TKA/lateral release/Exc bony overgrowth patella    JOINT REPLACEMENT Right 01/05/2019    Stage 2 revision, all 3 components/Open lateral release.    LEG AMPUTATION AT HIP Left 09/20/2012    MEDIPORT REMOVAL Left 8/30/2018     Procedure: REMOVAL, CATHETER, CENTRAL VENOUS, TUNNELED, WITH PORT;  Surgeon: Stu Godinez MD;  Location: [facility] OR;  Service: General;  Laterality: Left;    NECK SURGERY      2 surgeries    PORTACATH PLACEMENT      REVISION OF KNEE ARTHROPLASTY Right 2018    Procedure: REVISION, ARTHROPLASTY, KNEE-stage one;  Surgeon: Dillon Peacock MD;  Location: [facility] OR;  Service: Orthopedics;  Laterality: Right;    REVISION OF KNEE ARTHROPLASTY Right 2019    Procedure: REVISION, ARTHROPLASTY, KNEE STAGE 2-OPEN LATERAL RELEASE, WITH REMOVAL OF IMPLANTS.;  Surgeon: Dillon Peacock MD;  Location: [facility] OR;  Service: Orthopedics;  Laterality: Right;    TOTAL KNEE ARTHROPLASTY Right        Social History     Socioeconomic History    Marital status:      Spouse name: Not on file    Number of children: Not on file    Years of education: Not on file    Highest education level: Not on file   Occupational History    Not on file   Social Needs    Financial resource strain: Not on file    Food insecurity     Worry: Not on file     Inability: Not on file    Transportation needs     Medical: Not on file     Non-medical: Not on file   Tobacco Use    Smoking status: Former Smoker     Packs/day: 1.00     Types: Cigarettes     Quit date: 2000     Years since quittin.9    Smokeless tobacco: Former User    Tobacco comment: quit 18 years ago    Substance and Sexual Activity    Alcohol use: No     Frequency: Never    Drug use: No    Sexual activity: Never   Lifestyle    Physical activity     Days per week: Not on file     Minutes per session: Not on file    Stress: Not on file   Relationships    Social connections     Talks on phone: Not on file     Gets together: Not on file     Attends Mandaen service: Not on file     Active member of club or organization: Not on file     Attends meetings of clubs or organizations: Not on file     Relationship status: Not on file   Other Topics Concern    Not on file    Social History Narrative    Not on file         Medications/Allergies: See med card    Vitals:    20 0858   BP: (!) 177/74   Pulse: (!) 58   Resp: 18   Temp: 97.6 °F (36.4 °C)   TempSrc: Temporal   SpO2: (!) 94%   PainSc:   6   PainLoc: Neck         Physical exam:  Gen: A and O x3, pleasant, well-groomed  Skin: No rashes or obvious lesions  HEENT: PERRLA, no obvious deformities on ears or in canals. Trachea midline.  CVS: Regular rate and rhythm, normal palpable pulses.  Resp:No increased work of breathing, symmetrical chest rise.  Abdomen: Soft, NT/ND.  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     Neuro:  Upper extremities: 5/5 strength bilaterally   Lower extremities: 5/5 strength right side  Reflexes: Brachioradialis 2+, Bicep 2+, Tricep 2+. Patellar 0+, Achilles 0+ right side  Sensory:  Intact and symmetrical to light touch and pinprick in C2-T1 dermatomes bilaterally. Right leg: Intact and symmetrical to light touch and pinprick in L2-S1 dermatomes with some decreased sensation to light touch and pinprick throughout the entire leg.    Cervical Spine:  Cervical spine: ROM is full in flexion, extension and lateral rotation without increased pain.  Spurling's maneuver causes no neck pain to either side.  Myofascial exam: No Tenderness to palpation across cervical paraspinous region bilaterally.      Lumbar spine:  Lumbar spine:  Range of motion not tested, unable to stand without assistance.  Chico's test causes no increased pain on either side.    Supine straight leg raise is negative on the right side.  Internal and external rotation of the hip causes no increased pain on either side.  Myofascial exam: No tenderness to palpation across lumbar paraspinous muscles.    Tinel's test positive on the left and Phalen's positive on the right.    Imagin19 Xray Thoracolumbar spine:  Osseous demineralization is noted diffusely.  L1, L2, L3, and L4 posterior pedicle screw  fusion is suspected with intervertebral disc prostheses at the L2-L3 and L3-L4 levels.  Intervertebral disc height loss is noted at all lumbar levels.  Vertebral body heights are suboptimally evaluated secondary to poor penetration on the lateral likely secondary to body habitus.  Atherosclerotic calcifications are noted within the aorta.       Assessment:   The patient is a 75-year-old man with a history of bladder CA status post cystectomy also requiring left leg amputation, chronic low back pain with multiple lumbar surgeries who presents in [facility] for continued pain.    1. Cervical radiculopathy     2. Chronic pain syndrome     3. Postlaminectomy syndrome of lumbar region     4. Phantom limb pain     5. Opioid use agreement exists         Plan:  1.  Patient did well following the cervical ESDRAS.  This can be repeated in the future if necessary.  2.  We discussed that the only remaining option for his back and buttock pain as well as his phantom limb pain would be spinal cord stimulation.  However, we would like to avoid this if at all possible based on his history of infections in the past.  He verbalizes understanding and he will continue to take his pain medication.  Dr. Renae provided prescriptions for oxycodone-acetaminophen 10/325 milligrams up to 3 times daily as needed for pain as well as fentanyl 50 micro grams per hour.  I have reviewed the [place] Board of Pharmacy website and there are no abberancies.    3.  Follow-up in 3 months or sooner as needed.

## 2021-03-09 ENCOUNTER — OFFICE VISIT (OUTPATIENT)
Dept: PAIN MEDICINE | Facility: CLINIC | Age: 76
End: 2021-03-09
Payer: MEDICARE

## 2021-03-09 VITALS
HEART RATE: 60 BPM | RESPIRATION RATE: 20 BRPM | DIASTOLIC BLOOD PRESSURE: 65 MMHG | OXYGEN SATURATION: 95 % | SYSTOLIC BLOOD PRESSURE: 166 MMHG | TEMPERATURE: 98 F

## 2021-03-09 DIAGNOSIS — Z79.891 OPIOID USE AGREEMENT EXISTS: ICD-10-CM

## 2021-03-09 DIAGNOSIS — M54.12 CERVICAL RADICULOPATHY: ICD-10-CM

## 2021-03-09 DIAGNOSIS — M96.1 POSTLAMINECTOMY SYNDROME OF LUMBAR REGION: ICD-10-CM

## 2021-03-09 DIAGNOSIS — G89.4 CHRONIC PAIN SYNDROME: Primary | ICD-10-CM

## 2021-03-09 DIAGNOSIS — G54.6 PHANTOM LIMB PAIN: ICD-10-CM

## 2021-03-09 PROCEDURE — 3077F SYST BP >= 140 MM HG: CPT | Mod: CPTII,S$GLB,, | Performed by: PHYSICIAN ASSISTANT

## 2021-03-09 PROCEDURE — 1101F PR PT FALLS ASSESS DOC 0-1 FALLS W/OUT INJ PAST YR: ICD-10-PCS | Mod: CPTII,S$GLB,, | Performed by: PHYSICIAN ASSISTANT

## 2021-03-09 PROCEDURE — 3077F PR MOST RECENT SYSTOLIC BLOOD PRESSURE >= 140 MM HG: ICD-10-PCS | Mod: CPTII,S$GLB,, | Performed by: PHYSICIAN ASSISTANT

## 2021-03-09 PROCEDURE — 99999 PR PBB SHADOW E&M-EST. PATIENT-LVL III: CPT | Mod: PBBFAC,,, | Performed by: PHYSICIAN ASSISTANT

## 2021-03-09 PROCEDURE — 1159F PR MEDICATION LIST DOCUMENTED IN MEDICAL RECORD: ICD-10-PCS | Mod: S$GLB,,, | Performed by: PHYSICIAN ASSISTANT

## 2021-03-09 PROCEDURE — 1125F PR PAIN SEVERITY QUANTIFIED, PAIN PRESENT: ICD-10-PCS | Mod: S$GLB,,, | Performed by: PHYSICIAN ASSISTANT

## 2021-03-09 PROCEDURE — 3288F FALL RISK ASSESSMENT DOCD: CPT | Mod: CPTII,S$GLB,, | Performed by: PHYSICIAN ASSISTANT

## 2021-03-09 PROCEDURE — 99999 PR PBB SHADOW E&M-EST. PATIENT-LVL III: ICD-10-PCS | Mod: PBBFAC,,, | Performed by: PHYSICIAN ASSISTANT

## 2021-03-09 PROCEDURE — 3288F PR FALLS RISK ASSESSMENT DOCUMENTED: ICD-10-PCS | Mod: CPTII,S$GLB,, | Performed by: PHYSICIAN ASSISTANT

## 2021-03-09 PROCEDURE — 99214 OFFICE O/P EST MOD 30 MIN: CPT | Mod: S$GLB,,, | Performed by: PHYSICIAN ASSISTANT

## 2021-03-09 PROCEDURE — 1101F PT FALLS ASSESS-DOCD LE1/YR: CPT | Mod: CPTII,S$GLB,, | Performed by: PHYSICIAN ASSISTANT

## 2021-03-09 PROCEDURE — 3078F PR MOST RECENT DIASTOLIC BLOOD PRESSURE < 80 MM HG: ICD-10-PCS | Mod: CPTII,S$GLB,, | Performed by: PHYSICIAN ASSISTANT

## 2021-03-09 PROCEDURE — 99214 PR OFFICE/OUTPT VISIT, EST, LEVL IV, 30-39 MIN: ICD-10-PCS | Mod: S$GLB,,, | Performed by: PHYSICIAN ASSISTANT

## 2021-03-09 PROCEDURE — 1125F AMNT PAIN NOTED PAIN PRSNT: CPT | Mod: S$GLB,,, | Performed by: PHYSICIAN ASSISTANT

## 2021-03-09 PROCEDURE — 3078F DIAST BP <80 MM HG: CPT | Mod: CPTII,S$GLB,, | Performed by: PHYSICIAN ASSISTANT

## 2021-03-09 PROCEDURE — 1159F MED LIST DOCD IN RCRD: CPT | Mod: S$GLB,,, | Performed by: PHYSICIAN ASSISTANT

## 2021-03-09 RX ORDER — OXYCODONE AND ACETAMINOPHEN 10; 325 MG/1; MG/1
1 TABLET ORAL EVERY 8 HOURS PRN
Qty: 90 TABLET | Refills: 0 | Status: SHIPPED | OUTPATIENT
Start: 2021-05-27 | End: 2021-06-11 | Stop reason: SDUPTHER

## 2021-03-09 RX ORDER — OXYCODONE AND ACETAMINOPHEN 10; 325 MG/1; MG/1
1 TABLET ORAL EVERY 8 HOURS PRN
Qty: 90 TABLET | Refills: 0 | Status: SHIPPED | OUTPATIENT
Start: 2021-03-28 | End: 2021-04-27

## 2021-03-09 RX ORDER — FENTANYL 50 UG/1
1 PATCH TRANSDERMAL
Qty: 10 PATCH | Refills: 0 | Status: SHIPPED | OUTPATIENT
Start: 2021-04-27 | End: 2021-05-27

## 2021-03-09 RX ORDER — OXYCODONE AND ACETAMINOPHEN 10; 325 MG/1; MG/1
1 TABLET ORAL EVERY 8 HOURS PRN
Qty: 90 TABLET | Refills: 0 | Status: SHIPPED | OUTPATIENT
Start: 2021-04-27 | End: 2021-05-27

## 2021-03-09 RX ORDER — FENTANYL 50 UG/1
1 PATCH TRANSDERMAL
Qty: 10 PATCH | Refills: 0 | Status: SHIPPED | OUTPATIENT
Start: 2021-03-28 | End: 2021-04-27

## 2021-03-09 RX ORDER — FENTANYL 50 UG/1
1 PATCH TRANSDERMAL
Qty: 10 PATCH | Refills: 0 | Status: SHIPPED | OUTPATIENT
Start: 2021-05-27 | End: 2021-06-11 | Stop reason: SDUPTHER

## 2021-06-11 ENCOUNTER — OFFICE VISIT (OUTPATIENT)
Dept: PAIN MEDICINE | Facility: CLINIC | Age: 76
End: 2021-06-11
Payer: MEDICARE

## 2021-06-11 VITALS
HEART RATE: 60 BPM | RESPIRATION RATE: 20 BRPM | OXYGEN SATURATION: 95 % | SYSTOLIC BLOOD PRESSURE: 129 MMHG | DIASTOLIC BLOOD PRESSURE: 60 MMHG | TEMPERATURE: 98 F

## 2021-06-11 DIAGNOSIS — M96.1 POSTLAMINECTOMY SYNDROME OF LUMBAR REGION: ICD-10-CM

## 2021-06-11 DIAGNOSIS — Z79.891 OPIOID USE AGREEMENT EXISTS: ICD-10-CM

## 2021-06-11 DIAGNOSIS — M54.12 CERVICAL RADICULOPATHY: ICD-10-CM

## 2021-06-11 DIAGNOSIS — G54.6 PHANTOM LIMB PAIN: ICD-10-CM

## 2021-06-11 DIAGNOSIS — G89.4 CHRONIC PAIN SYNDROME: Primary | ICD-10-CM

## 2021-06-11 PROCEDURE — 3288F PR FALLS RISK ASSESSMENT DOCUMENTED: ICD-10-PCS | Mod: CPTII,S$GLB,, | Performed by: PHYSICIAN ASSISTANT

## 2021-06-11 PROCEDURE — 1159F PR MEDICATION LIST DOCUMENTED IN MEDICAL RECORD: ICD-10-PCS | Mod: S$GLB,,, | Performed by: PHYSICIAN ASSISTANT

## 2021-06-11 PROCEDURE — 99214 OFFICE O/P EST MOD 30 MIN: CPT | Mod: S$GLB,,, | Performed by: PHYSICIAN ASSISTANT

## 2021-06-11 PROCEDURE — 99999 PR PBB SHADOW E&M-EST. PATIENT-LVL IV: CPT | Mod: PBBFAC,,, | Performed by: PHYSICIAN ASSISTANT

## 2021-06-11 PROCEDURE — 1101F PR PT FALLS ASSESS DOC 0-1 FALLS W/OUT INJ PAST YR: ICD-10-PCS | Mod: CPTII,S$GLB,, | Performed by: PHYSICIAN ASSISTANT

## 2021-06-11 PROCEDURE — 99214 PR OFFICE/OUTPT VISIT, EST, LEVL IV, 30-39 MIN: ICD-10-PCS | Mod: S$GLB,,, | Performed by: PHYSICIAN ASSISTANT

## 2021-06-11 PROCEDURE — 99999 PR PBB SHADOW E&M-EST. PATIENT-LVL IV: ICD-10-PCS | Mod: PBBFAC,,, | Performed by: PHYSICIAN ASSISTANT

## 2021-06-11 PROCEDURE — 1159F MED LIST DOCD IN RCRD: CPT | Mod: S$GLB,,, | Performed by: PHYSICIAN ASSISTANT

## 2021-06-11 PROCEDURE — 1125F PR PAIN SEVERITY QUANTIFIED, PAIN PRESENT: ICD-10-PCS | Mod: S$GLB,,, | Performed by: PHYSICIAN ASSISTANT

## 2021-06-11 PROCEDURE — 1101F PT FALLS ASSESS-DOCD LE1/YR: CPT | Mod: CPTII,S$GLB,, | Performed by: PHYSICIAN ASSISTANT

## 2021-06-11 PROCEDURE — 3288F FALL RISK ASSESSMENT DOCD: CPT | Mod: CPTII,S$GLB,, | Performed by: PHYSICIAN ASSISTANT

## 2021-06-11 PROCEDURE — 1125F AMNT PAIN NOTED PAIN PRSNT: CPT | Mod: S$GLB,,, | Performed by: PHYSICIAN ASSISTANT

## 2021-06-11 RX ORDER — FENTANYL 50 UG/1
1 PATCH TRANSDERMAL
Qty: 10 PATCH | Refills: 0 | Status: SHIPPED | OUTPATIENT
Start: 2021-07-27 | End: 2021-08-26

## 2021-06-11 RX ORDER — OXYCODONE AND ACETAMINOPHEN 10; 325 MG/1; MG/1
1 TABLET ORAL EVERY 8 HOURS PRN
Qty: 90 TABLET | Refills: 0 | Status: SHIPPED | OUTPATIENT
Start: 2021-06-27 | End: 2021-07-27

## 2021-06-11 RX ORDER — FENTANYL 50 UG/1
1 PATCH TRANSDERMAL
Qty: 10 PATCH | Refills: 0 | Status: SHIPPED | OUTPATIENT
Start: 2021-08-26 | End: 2021-09-10 | Stop reason: SDUPTHER

## 2021-06-11 RX ORDER — OXYCODONE AND ACETAMINOPHEN 10; 325 MG/1; MG/1
1 TABLET ORAL EVERY 8 HOURS PRN
Qty: 90 TABLET | Refills: 0 | Status: SHIPPED | OUTPATIENT
Start: 2021-07-27 | End: 2021-08-26

## 2021-06-11 RX ORDER — FENTANYL 50 UG/1
1 PATCH TRANSDERMAL
Qty: 10 PATCH | Refills: 0 | Status: SHIPPED | OUTPATIENT
Start: 2021-06-27 | End: 2021-07-27

## 2021-06-11 RX ORDER — OXYCODONE AND ACETAMINOPHEN 10; 325 MG/1; MG/1
1 TABLET ORAL EVERY 8 HOURS PRN
Qty: 90 TABLET | Refills: 0 | Status: SHIPPED | OUTPATIENT
Start: 2021-08-26 | End: 2021-09-10 | Stop reason: SDUPTHER

## 2021-09-10 ENCOUNTER — OFFICE VISIT (OUTPATIENT)
Dept: PAIN MEDICINE | Facility: CLINIC | Age: 76
End: 2021-09-10
Payer: MEDICARE

## 2021-09-10 VITALS
HEART RATE: 61 BPM | RESPIRATION RATE: 20 BRPM | DIASTOLIC BLOOD PRESSURE: 62 MMHG | TEMPERATURE: 97 F | SYSTOLIC BLOOD PRESSURE: 135 MMHG | OXYGEN SATURATION: 95 %

## 2021-09-10 DIAGNOSIS — Z79.891 OPIOID USE AGREEMENT EXISTS: ICD-10-CM

## 2021-09-10 DIAGNOSIS — M25.511 CHRONIC RIGHT SHOULDER PAIN: ICD-10-CM

## 2021-09-10 DIAGNOSIS — G89.4 CHRONIC PAIN SYNDROME: Primary | ICD-10-CM

## 2021-09-10 DIAGNOSIS — M96.1 POSTLAMINECTOMY SYNDROME OF LUMBAR REGION: ICD-10-CM

## 2021-09-10 DIAGNOSIS — G54.6 PHANTOM LIMB PAIN: ICD-10-CM

## 2021-09-10 DIAGNOSIS — G89.29 CHRONIC RIGHT SHOULDER PAIN: ICD-10-CM

## 2021-09-10 PROCEDURE — 99999 PR PBB SHADOW E&M-EST. PATIENT-LVL IV: CPT | Mod: PBBFAC,,, | Performed by: PHYSICIAN ASSISTANT

## 2021-09-10 PROCEDURE — 1125F AMNT PAIN NOTED PAIN PRSNT: CPT | Mod: CPTII,S$GLB,, | Performed by: PHYSICIAN ASSISTANT

## 2021-09-10 PROCEDURE — 3078F DIAST BP <80 MM HG: CPT | Mod: CPTII,S$GLB,, | Performed by: PHYSICIAN ASSISTANT

## 2021-09-10 PROCEDURE — 1159F PR MEDICATION LIST DOCUMENTED IN MEDICAL RECORD: ICD-10-PCS | Mod: CPTII,S$GLB,, | Performed by: PHYSICIAN ASSISTANT

## 2021-09-10 PROCEDURE — 1159F MED LIST DOCD IN RCRD: CPT | Mod: CPTII,S$GLB,, | Performed by: PHYSICIAN ASSISTANT

## 2021-09-10 PROCEDURE — 1101F PT FALLS ASSESS-DOCD LE1/YR: CPT | Mod: CPTII,S$GLB,, | Performed by: PHYSICIAN ASSISTANT

## 2021-09-10 PROCEDURE — 1160F PR REVIEW ALL MEDS BY PRESCRIBER/CLIN PHARMACIST DOCUMENTED: ICD-10-PCS | Mod: CPTII,S$GLB,, | Performed by: PHYSICIAN ASSISTANT

## 2021-09-10 PROCEDURE — 80307 DRUG TEST PRSMV CHEM ANLYZR: CPT | Performed by: PHYSICIAN ASSISTANT

## 2021-09-10 PROCEDURE — 3288F FALL RISK ASSESSMENT DOCD: CPT | Mod: CPTII,S$GLB,, | Performed by: PHYSICIAN ASSISTANT

## 2021-09-10 PROCEDURE — 3078F PR MOST RECENT DIASTOLIC BLOOD PRESSURE < 80 MM HG: ICD-10-PCS | Mod: CPTII,S$GLB,, | Performed by: PHYSICIAN ASSISTANT

## 2021-09-10 PROCEDURE — 3075F SYST BP GE 130 - 139MM HG: CPT | Mod: CPTII,S$GLB,, | Performed by: PHYSICIAN ASSISTANT

## 2021-09-10 PROCEDURE — 99214 OFFICE O/P EST MOD 30 MIN: CPT | Mod: S$GLB,,, | Performed by: PHYSICIAN ASSISTANT

## 2021-09-10 PROCEDURE — 1160F RVW MEDS BY RX/DR IN RCRD: CPT | Mod: CPTII,S$GLB,, | Performed by: PHYSICIAN ASSISTANT

## 2021-09-10 PROCEDURE — 1125F PR PAIN SEVERITY QUANTIFIED, PAIN PRESENT: ICD-10-PCS | Mod: CPTII,S$GLB,, | Performed by: PHYSICIAN ASSISTANT

## 2021-09-10 PROCEDURE — 3075F PR MOST RECENT SYSTOLIC BLOOD PRESS GE 130-139MM HG: ICD-10-PCS | Mod: CPTII,S$GLB,, | Performed by: PHYSICIAN ASSISTANT

## 2021-09-10 PROCEDURE — 1101F PR PT FALLS ASSESS DOC 0-1 FALLS W/OUT INJ PAST YR: ICD-10-PCS | Mod: CPTII,S$GLB,, | Performed by: PHYSICIAN ASSISTANT

## 2021-09-10 PROCEDURE — 99214 PR OFFICE/OUTPT VISIT, EST, LEVL IV, 30-39 MIN: ICD-10-PCS | Mod: S$GLB,,, | Performed by: PHYSICIAN ASSISTANT

## 2021-09-10 PROCEDURE — 99999 PR PBB SHADOW E&M-EST. PATIENT-LVL IV: ICD-10-PCS | Mod: PBBFAC,,, | Performed by: PHYSICIAN ASSISTANT

## 2021-09-10 PROCEDURE — 3288F PR FALLS RISK ASSESSMENT DOCUMENTED: ICD-10-PCS | Mod: CPTII,S$GLB,, | Performed by: PHYSICIAN ASSISTANT

## 2021-09-10 RX ORDER — OXYCODONE AND ACETAMINOPHEN 10; 325 MG/1; MG/1
1 TABLET ORAL EVERY 8 HOURS PRN
Qty: 90 TABLET | Refills: 0 | Status: SHIPPED | OUTPATIENT
Start: 2021-09-26 | End: 2021-10-26

## 2021-09-10 RX ORDER — OXYCODONE AND ACETAMINOPHEN 10; 325 MG/1; MG/1
1 TABLET ORAL EVERY 8 HOURS PRN
Qty: 90 TABLET | Refills: 0 | Status: SHIPPED | OUTPATIENT
Start: 2021-11-25 | End: 2021-12-08 | Stop reason: SDUPTHER

## 2021-09-10 RX ORDER — FENTANYL 50 UG/1
1 PATCH TRANSDERMAL
Qty: 10 PATCH | Refills: 0 | Status: SHIPPED | OUTPATIENT
Start: 2021-09-26 | End: 2021-10-26

## 2021-09-10 RX ORDER — OXYCODONE AND ACETAMINOPHEN 10; 325 MG/1; MG/1
1 TABLET ORAL EVERY 8 HOURS PRN
Qty: 90 TABLET | Refills: 0 | Status: SHIPPED | OUTPATIENT
Start: 2021-10-26 | End: 2021-11-25

## 2021-09-10 RX ORDER — FENTANYL 50 UG/1
1 PATCH TRANSDERMAL
Qty: 10 PATCH | Refills: 0 | Status: SHIPPED | OUTPATIENT
Start: 2021-11-25 | End: 2021-12-08 | Stop reason: SDUPTHER

## 2021-09-10 RX ORDER — FENTANYL 50 UG/1
1 PATCH TRANSDERMAL
Qty: 10 PATCH | Refills: 0 | Status: SHIPPED | OUTPATIENT
Start: 2021-10-26 | End: 2021-11-25

## 2021-09-16 LAB
6MAM UR QL: NOT DETECTED
7AMINOCLONAZEPAM UR QL: NOT DETECTED
A-OH ALPRAZ UR QL: NOT DETECTED
ALPHA-OH-MIDAZOLAM: NOT DETECTED
ALPRAZ UR QL: NOT DETECTED
AMPHET UR QL SCN: NOT DETECTED
ANNOTATION COMMENT IMP: NORMAL
ANNOTATION COMMENT IMP: NORMAL
BARBITURATES UR QL: NOT DETECTED
BUPRENORPHINE UR QL: NOT DETECTED
BZE UR QL: NOT DETECTED
CARBOXYTHC UR QL: NOT DETECTED
CARISOPRODOL UR QL: NOT DETECTED
CLONAZEPAM UR QL: NOT DETECTED
CODEINE UR QL: NOT DETECTED
CREAT UR-MCNC: 119.8 MG/DL (ref 20–400)
DIAZEPAM UR QL: NOT DETECTED
ETHYL GLUCURONIDE UR QL: NOT DETECTED
FENTANYL UR QL: PRESENT
GABAPENTIN: NOT DETECTED
HYDROCODONE UR QL: NOT DETECTED
HYDROMORPHONE UR QL: NOT DETECTED
LORAZEPAM UR QL: NOT DETECTED
MDA UR QL: NOT DETECTED
MDEA UR QL: NOT DETECTED
MDMA UR QL: NOT DETECTED
ME-PHENIDATE UR QL: NOT DETECTED
METHADONE UR QL: NOT DETECTED
METHAMPHET UR QL: NOT DETECTED
MIDAZOLAM UR QL SCN: NOT DETECTED
MORPHINE UR QL: NOT DETECTED
NALOXONE: NOT DETECTED
NORBUPRENORPHINE UR QL CFM: NOT DETECTED
NORDIAZEPAM UR QL: NOT DETECTED
NORFENTANYL UR QL: PRESENT
NORHYDROCODONE UR QL CFM: NOT DETECTED
NORMEPERIDINE UR QL CFM: NOT DETECTED
NOROXYCODONE UR QL CFM: PRESENT
NOROXYMORPHONE UR QL SCN: NOT DETECTED
OXAZEPAM UR QL: NOT DETECTED
OXYCODONE UR QL: PRESENT
OXYMORPHONE UR QL: PRESENT
PATHOLOGY STUDY: NORMAL
PCP UR QL: NOT DETECTED
PHENTERMINE UR QL: NOT DETECTED
PREGABALIN: NOT DETECTED
SERVICE CMNT-IMP: NORMAL
TAPENTADOL UR QL SCN: NOT DETECTED
TAPENTADOL UR QL SCN: NOT DETECTED
TEMAZEPAM UR QL: NOT DETECTED
TRAMADOL UR QL: NOT DETECTED
ZOLPIDEM METABOLITE: NOT DETECTED
ZOLPIDEM UR QL: NOT DETECTED

## 2021-09-23 DIAGNOSIS — M25.511 CHRONIC RIGHT SHOULDER PAIN: Primary | ICD-10-CM

## 2021-09-23 DIAGNOSIS — G89.29 CHRONIC RIGHT SHOULDER PAIN: Primary | ICD-10-CM

## 2021-10-05 ENCOUNTER — TELEPHONE (OUTPATIENT)
Dept: PHYSICAL MEDICINE AND REHAB | Facility: CLINIC | Age: 76
End: 2021-10-05

## 2021-10-18 ENCOUNTER — OFFICE VISIT (OUTPATIENT)
Dept: PHYSICAL MEDICINE AND REHAB | Facility: CLINIC | Age: 76
End: 2021-10-18
Payer: MEDICARE

## 2021-10-18 ENCOUNTER — HOSPITAL ENCOUNTER (OUTPATIENT)
Dept: RADIOLOGY | Facility: HOSPITAL | Age: 76
Discharge: HOME | End: 2021-10-18
Attending: PHYSICAL MEDICINE & REHABILITATION
Payer: MEDICARE

## 2021-10-18 VITALS — HEIGHT: 71 IN | WEIGHT: 285.06 LBS | BODY MASS INDEX: 39.91 KG/M2

## 2021-10-18 DIAGNOSIS — M25.511 CHRONIC RIGHT SHOULDER PAIN: ICD-10-CM

## 2021-10-18 DIAGNOSIS — M19.011 PRIMARY OSTEOARTHRITIS OF RIGHT SHOULDER: Primary | ICD-10-CM

## 2021-10-18 DIAGNOSIS — G89.29 CHRONIC RIGHT SHOULDER PAIN: ICD-10-CM

## 2021-10-18 DIAGNOSIS — M75.81 RIGHT ROTATOR CUFF TENDONITIS: ICD-10-CM

## 2021-10-18 PROCEDURE — 99999 PR PBB SHADOW E&M-EST. PATIENT-LVL III: ICD-10-PCS | Mod: PBBFAC,,, | Performed by: PHYSICAL MEDICINE & REHABILITATION

## 2021-10-18 PROCEDURE — 3288F FALL RISK ASSESSMENT DOCD: CPT | Mod: CPTII,S$GLB,, | Performed by: PHYSICAL MEDICINE & REHABILITATION

## 2021-10-18 PROCEDURE — 1159F MED LIST DOCD IN RCRD: CPT | Mod: CPTII,S$GLB,, | Performed by: PHYSICAL MEDICINE & REHABILITATION

## 2021-10-18 PROCEDURE — 1160F PR REVIEW ALL MEDS BY PRESCRIBER/CLIN PHARMACIST DOCUMENTED: ICD-10-PCS | Mod: CPTII,S$GLB,, | Performed by: PHYSICAL MEDICINE & REHABILITATION

## 2021-10-18 PROCEDURE — 1159F PR MEDICATION LIST DOCUMENTED IN MEDICAL RECORD: ICD-10-PCS | Mod: CPTII,S$GLB,, | Performed by: PHYSICAL MEDICINE & REHABILITATION

## 2021-10-18 PROCEDURE — 73030 X-RAY EXAM OF SHOULDER: CPT | Mod: TC,PO,RT

## 2021-10-18 PROCEDURE — 20611 DRAIN/INJ JOINT/BURSA W/US: CPT | Mod: RT,S$GLB,, | Performed by: PHYSICAL MEDICINE & REHABILITATION

## 2021-10-18 PROCEDURE — 73030 XR SHOULDER COMPLETE 2 OR MORE VIEWS RIGHT: ICD-10-PCS | Mod: 26,RT,, | Performed by: RADIOLOGY

## 2021-10-18 PROCEDURE — 1101F PT FALLS ASSESS-DOCD LE1/YR: CPT | Mod: CPTII,S$GLB,, | Performed by: PHYSICAL MEDICINE & REHABILITATION

## 2021-10-18 PROCEDURE — 1126F AMNT PAIN NOTED NONE PRSNT: CPT | Mod: CPTII,S$GLB,, | Performed by: PHYSICAL MEDICINE & REHABILITATION

## 2021-10-18 PROCEDURE — 99214 PR OFFICE/OUTPT VISIT, EST, LEVL IV, 30-39 MIN: ICD-10-PCS | Mod: 25,GC,S$GLB, | Performed by: PHYSICAL MEDICINE & REHABILITATION

## 2021-10-18 PROCEDURE — 1160F RVW MEDS BY RX/DR IN RCRD: CPT | Mod: CPTII,S$GLB,, | Performed by: PHYSICAL MEDICINE & REHABILITATION

## 2021-10-18 PROCEDURE — 20611 LARGE JOINT ASPIRATION/INJECTION: R GLENOHUMERAL: ICD-10-PCS | Mod: RT,S$GLB,, | Performed by: PHYSICAL MEDICINE & REHABILITATION

## 2021-10-18 PROCEDURE — 3008F BODY MASS INDEX DOCD: CPT | Mod: CPTII,S$GLB,, | Performed by: PHYSICAL MEDICINE & REHABILITATION

## 2021-10-18 PROCEDURE — 99214 OFFICE O/P EST MOD 30 MIN: CPT | Mod: 25,GC,S$GLB, | Performed by: PHYSICAL MEDICINE & REHABILITATION

## 2021-10-18 PROCEDURE — 3008F PR BODY MASS INDEX (BMI) DOCUMENTED: ICD-10-PCS | Mod: CPTII,S$GLB,, | Performed by: PHYSICAL MEDICINE & REHABILITATION

## 2021-10-18 PROCEDURE — 1101F PR PT FALLS ASSESS DOC 0-1 FALLS W/OUT INJ PAST YR: ICD-10-PCS | Mod: CPTII,S$GLB,, | Performed by: PHYSICAL MEDICINE & REHABILITATION

## 2021-10-18 PROCEDURE — 73030 X-RAY EXAM OF SHOULDER: CPT | Mod: 26,RT,, | Performed by: RADIOLOGY

## 2021-10-18 PROCEDURE — 99999 PR PBB SHADOW E&M-EST. PATIENT-LVL III: CPT | Mod: PBBFAC,,, | Performed by: PHYSICAL MEDICINE & REHABILITATION

## 2021-10-18 PROCEDURE — 3288F PR FALLS RISK ASSESSMENT DOCUMENTED: ICD-10-PCS | Mod: CPTII,S$GLB,, | Performed by: PHYSICAL MEDICINE & REHABILITATION

## 2021-10-18 PROCEDURE — 1126F PR PAIN SEVERITY QUANTIFIED, NO PAIN PRESENT: ICD-10-PCS | Mod: CPTII,S$GLB,, | Performed by: PHYSICAL MEDICINE & REHABILITATION

## 2021-10-18 RX ORDER — TRIAMCINOLONE ACETONIDE 40 MG/ML
40 INJECTION, SUSPENSION INTRA-ARTICULAR; INTRAMUSCULAR
Status: DISCONTINUED | OUTPATIENT
Start: 2021-10-18 | End: 2021-10-18 | Stop reason: HOSPADM

## 2021-10-18 RX ADMIN — TRIAMCINOLONE ACETONIDE 40 MG: 40 INJECTION, SUSPENSION INTRA-ARTICULAR; INTRAMUSCULAR at 09:10

## 2021-12-08 ENCOUNTER — OFFICE VISIT (OUTPATIENT)
Dept: PAIN MEDICINE | Facility: CLINIC | Age: 76
End: 2021-12-08
Payer: MEDICARE

## 2021-12-08 VITALS
RESPIRATION RATE: 18 BRPM | DIASTOLIC BLOOD PRESSURE: 67 MMHG | HEART RATE: 62 BPM | SYSTOLIC BLOOD PRESSURE: 155 MMHG | OXYGEN SATURATION: 95 % | TEMPERATURE: 98 F

## 2021-12-08 DIAGNOSIS — G54.6 PHANTOM LIMB PAIN: ICD-10-CM

## 2021-12-08 DIAGNOSIS — M96.1 POSTLAMINECTOMY SYNDROME OF LUMBAR REGION: ICD-10-CM

## 2021-12-08 DIAGNOSIS — G89.4 CHRONIC PAIN SYNDROME: Primary | ICD-10-CM

## 2021-12-08 DIAGNOSIS — Z79.891 OPIOID USE AGREEMENT EXISTS: ICD-10-CM

## 2021-12-08 PROCEDURE — 99999 PR PBB SHADOW E&M-EST. PATIENT-LVL IV: CPT | Mod: PBBFAC,,, | Performed by: PHYSICIAN ASSISTANT

## 2021-12-08 PROCEDURE — 99214 PR OFFICE/OUTPT VISIT, EST, LEVL IV, 30-39 MIN: ICD-10-PCS | Mod: S$GLB,,, | Performed by: PHYSICIAN ASSISTANT

## 2021-12-08 PROCEDURE — 99214 OFFICE O/P EST MOD 30 MIN: CPT | Mod: S$GLB,,, | Performed by: PHYSICIAN ASSISTANT

## 2021-12-08 PROCEDURE — 99999 PR PBB SHADOW E&M-EST. PATIENT-LVL IV: ICD-10-PCS | Mod: PBBFAC,,, | Performed by: PHYSICIAN ASSISTANT

## 2021-12-08 RX ORDER — OXYCODONE AND ACETAMINOPHEN 10; 325 MG/1; MG/1
1 TABLET ORAL EVERY 8 HOURS PRN
Qty: 90 TABLET | Refills: 0 | Status: SHIPPED | OUTPATIENT
Start: 2021-12-29 | End: 2022-01-28

## 2021-12-08 RX ORDER — OXYCODONE AND ACETAMINOPHEN 10; 325 MG/1; MG/1
1 TABLET ORAL EVERY 8 HOURS PRN
Qty: 90 TABLET | Refills: 0 | Status: SHIPPED | OUTPATIENT
Start: 2022-01-28 | End: 2022-02-27

## 2021-12-08 RX ORDER — FENTANYL 50 UG/1
1 PATCH TRANSDERMAL
Qty: 10 PATCH | Refills: 0 | Status: SHIPPED | OUTPATIENT
Start: 2022-02-27 | End: 2022-03-29

## 2021-12-08 RX ORDER — FENTANYL 50 UG/1
1 PATCH TRANSDERMAL
Qty: 10 PATCH | Refills: 0 | Status: SHIPPED | OUTPATIENT
Start: 2022-01-28 | End: 2022-02-27

## 2021-12-08 RX ORDER — FENTANYL 50 UG/1
1 PATCH TRANSDERMAL
Qty: 10 PATCH | Refills: 0 | Status: SHIPPED | OUTPATIENT
Start: 2021-12-29 | End: 2022-01-28

## 2021-12-08 RX ORDER — OXYCODONE AND ACETAMINOPHEN 10; 325 MG/1; MG/1
1 TABLET ORAL EVERY 8 HOURS PRN
Qty: 90 TABLET | Refills: 0 | Status: SHIPPED | OUTPATIENT
Start: 2022-02-27 | End: 2022-03-29

## 2022-01-25 ENCOUNTER — TELEPHONE (OUTPATIENT)
Dept: PHYSICAL MEDICINE AND REHAB | Facility: CLINIC | Age: 77
End: 2022-01-25
Payer: MEDICARE

## 2022-01-25 NOTE — TELEPHONE ENCOUNTER
----- Message from Leila Woodward sent at 1/25/2022 11:11 AM CST -----  Type: Needs Medical Advice  Who Called:  Pt  Symptoms (please be specific):  Pt is asking to discuss injection with the office    Best Call Back Number: [phone]  Additional Information: Please call and advise

## 2022-03-07 ENCOUNTER — TELEPHONE (OUTPATIENT)
Dept: PAIN MEDICINE | Facility: CLINIC | Age: 77
End: 2022-03-07
Payer: MEDICARE

## 2022-03-07 NOTE — TELEPHONE ENCOUNTER
----- Message from Jens Hendrickson sent at 3/7/2022 12:19 PM CST -----  Regarding: advice  Contact: self  Type: Needs Medical Advice  Who Called:  self  Symptoms (please be specific):    How long has patient had these symptoms:    Pharmacy name and phone #:    Best Call Back Number: [phone]  Additional Information: Patient was admitted into [facility] for kidney stone. Patient requesting to speak with the nurse.

## 2022-03-07 NOTE — TELEPHONE ENCOUNTER
Patient notified to contact office once he is discharged from the hospital. He verbalized understanding.

## 2022-03-07 NOTE — TELEPHONE ENCOUNTER
Spoke with patient and he stated he has been admitted into [facility] for issues involving kidney stones. He stated he is scheduled to undergo surgery tomorrow and will not be able to make 3 month f/u on 3/14. He would like to know will he still be able to receive prescription.

## 2022-04-18 ENCOUNTER — TELEPHONE (OUTPATIENT)
Dept: PAIN MEDICINE | Facility: CLINIC | Age: 77
End: 2022-04-18
Payer: MEDICARE

## 2022-04-18 RX ORDER — FENTANYL 25 UG/1
1 PATCH TRANSDERMAL
Qty: 10 PATCH | Refills: 0 | Status: SHIPPED | OUTPATIENT
Start: 2022-04-18 | End: 2022-05-03 | Stop reason: SDUPTHER

## 2022-04-18 RX ORDER — OXYCODONE AND ACETAMINOPHEN 10; 325 MG/1; MG/1
1 TABLET ORAL EVERY 8 HOURS PRN
Qty: 30 TABLET | Refills: 0 | Status: SHIPPED | OUTPATIENT
Start: 2022-04-18 | End: 2022-04-19

## 2022-04-18 NOTE — TELEPHONE ENCOUNTER
----- Message from Maria G Jain sent at 4/18/2022  3:43 PM CDT -----  Contact: pt       Type:  RX Refill Request    Who Called:  pt  Refill or New Rx:  refill  RX Name and Strength: See listed below    fentaNYL (DURAGESIC) 50 mcg/hr  oxyCODONE-acetaminophen (PERCOCET)  mg per tablet  How is the patient currently taking it? (ex. 1XDay):  as directed   Is this a 30 day or 90 day RX:  30 day   Preferred Pharmacy with phone number:    [address] [address]  Phone: [phone] Fax: [phone]  Local or Mail Order:  Local  Ordering Provider:  Mounika Cowart Call Back Number:  [phone]  Additional Information:  pt states he just got out of nursing home and is bed ridden needs these medications filled as soon as possible

## 2022-04-18 NOTE — TELEPHONE ENCOUNTER
Please let him know that I sent medication in for him but he needs to follow-up ASAP, he had not been on some of this medication for a while and was at lower doses

## 2022-04-19 ENCOUNTER — TELEPHONE (OUTPATIENT)
Dept: PAIN MEDICINE | Facility: CLINIC | Age: 77
End: 2022-04-19
Payer: MEDICARE

## 2022-04-19 RX ORDER — HYDROCODONE BITARTRATE AND ACETAMINOPHEN 10; 325 MG/1; MG/1
1 TABLET ORAL EVERY 8 HOURS PRN
Qty: 30 TABLET | Refills: 0 | Status: SHIPPED | OUTPATIENT
Start: 2022-04-19 | End: 2022-05-03 | Stop reason: SDUPTHER

## 2022-04-19 NOTE — TELEPHONE ENCOUNTER
Spoke with patient and he states that he only said hydrocodone because he could not remember which medication he was taking/being prescribed before. He thought that hydrocodone and oxycodone were the same thing. He also asked about his quantity only being 30 tablets instead of 90. I explained that he is only supposed to take them as needed for severe pain that the patches don't help and he said that he is in severe pain all day every day and that is why he normally takes them 3 times daily.

## 2022-04-19 NOTE — TELEPHONE ENCOUNTER
----- Message from Sandie Chapman sent at 4/19/2022  3:29 PM CDT -----  Type:  Pharmacy Calling to Clarify an RX    Name of Caller:  Ester  Pharmacy Name:  [facility]  Prescription Name:  Percocet  What do they need to clarify?:  Patient is asking for hydrocodone instead of oxycodone  Best Call Back Number:  [phone]  Additional Information:  Thank you!

## 2022-04-19 NOTE — TELEPHONE ENCOUNTER
Informed patient that medication was sent to the pharmacy. Offered to schedule a follow up appointment. He stated he has been in the hospital for 9 weeks and is getting set up with home health. He stated as soon as he is able he will schedule an appointment.

## 2022-04-19 NOTE — TELEPHONE ENCOUNTER
I can send the hydrocodone instead of the oxycodone but please ask why and make sure he does not fill both of these

## 2022-04-20 NOTE — TELEPHONE ENCOUNTER
I had given him a smaller number than I had previously because he was just discharged from the hospital and I wanted him to follow-up before restarting this add what he may have been taking previously

## 2022-05-03 ENCOUNTER — OFFICE VISIT (OUTPATIENT)
Dept: PAIN MEDICINE | Facility: CLINIC | Age: 77
End: 2022-05-03
Payer: MEDICARE

## 2022-05-03 VITALS
DIASTOLIC BLOOD PRESSURE: 54 MMHG | HEIGHT: 71 IN | BODY MASS INDEX: 40.13 KG/M2 | HEART RATE: 65 BPM | SYSTOLIC BLOOD PRESSURE: 114 MMHG | WEIGHT: 286.63 LBS

## 2022-05-03 DIAGNOSIS — M96.1 POSTLAMINECTOMY SYNDROME OF LUMBAR REGION: ICD-10-CM

## 2022-05-03 DIAGNOSIS — G54.6 PHANTOM LIMB PAIN: ICD-10-CM

## 2022-05-03 DIAGNOSIS — G89.4 CHRONIC PAIN SYNDROME: Primary | ICD-10-CM

## 2022-05-03 DIAGNOSIS — Z79.891 OPIOID USE AGREEMENT EXISTS: ICD-10-CM

## 2022-05-03 PROCEDURE — 1159F MED LIST DOCD IN RCRD: CPT | Mod: CPTII,S$GLB,, | Performed by: PHYSICIAN ASSISTANT

## 2022-05-03 PROCEDURE — 1160F RVW MEDS BY RX/DR IN RCRD: CPT | Mod: CPTII,S$GLB,, | Performed by: PHYSICIAN ASSISTANT

## 2022-05-03 PROCEDURE — 99214 PR OFFICE/OUTPT VISIT, EST, LEVL IV, 30-39 MIN: ICD-10-PCS | Mod: S$GLB,,, | Performed by: PHYSICIAN ASSISTANT

## 2022-05-03 PROCEDURE — 3074F PR MOST RECENT SYSTOLIC BLOOD PRESSURE < 130 MM HG: ICD-10-PCS | Mod: CPTII,S$GLB,, | Performed by: PHYSICIAN ASSISTANT

## 2022-05-03 PROCEDURE — 99214 OFFICE O/P EST MOD 30 MIN: CPT | Mod: S$GLB,,, | Performed by: PHYSICIAN ASSISTANT

## 2022-05-03 PROCEDURE — 1101F PR PT FALLS ASSESS DOC 0-1 FALLS W/OUT INJ PAST YR: ICD-10-PCS | Mod: CPTII,S$GLB,, | Performed by: PHYSICIAN ASSISTANT

## 2022-05-03 PROCEDURE — 3078F DIAST BP <80 MM HG: CPT | Mod: CPTII,S$GLB,, | Performed by: PHYSICIAN ASSISTANT

## 2022-05-03 PROCEDURE — 1125F PR PAIN SEVERITY QUANTIFIED, PAIN PRESENT: ICD-10-PCS | Mod: CPTII,S$GLB,, | Performed by: PHYSICIAN ASSISTANT

## 2022-05-03 PROCEDURE — 3288F FALL RISK ASSESSMENT DOCD: CPT | Mod: CPTII,S$GLB,, | Performed by: PHYSICIAN ASSISTANT

## 2022-05-03 PROCEDURE — 3074F SYST BP LT 130 MM HG: CPT | Mod: CPTII,S$GLB,, | Performed by: PHYSICIAN ASSISTANT

## 2022-05-03 PROCEDURE — 1125F AMNT PAIN NOTED PAIN PRSNT: CPT | Mod: CPTII,S$GLB,, | Performed by: PHYSICIAN ASSISTANT

## 2022-05-03 PROCEDURE — 1160F PR REVIEW ALL MEDS BY PRESCRIBER/CLIN PHARMACIST DOCUMENTED: ICD-10-PCS | Mod: CPTII,S$GLB,, | Performed by: PHYSICIAN ASSISTANT

## 2022-05-03 PROCEDURE — 1159F PR MEDICATION LIST DOCUMENTED IN MEDICAL RECORD: ICD-10-PCS | Mod: CPTII,S$GLB,, | Performed by: PHYSICIAN ASSISTANT

## 2022-05-03 PROCEDURE — 1101F PT FALLS ASSESS-DOCD LE1/YR: CPT | Mod: CPTII,S$GLB,, | Performed by: PHYSICIAN ASSISTANT

## 2022-05-03 PROCEDURE — 99999 PR PBB SHADOW E&M-EST. PATIENT-LVL III: ICD-10-PCS | Mod: PBBFAC,,, | Performed by: PHYSICIAN ASSISTANT

## 2022-05-03 PROCEDURE — 3288F PR FALLS RISK ASSESSMENT DOCUMENTED: ICD-10-PCS | Mod: CPTII,S$GLB,, | Performed by: PHYSICIAN ASSISTANT

## 2022-05-03 PROCEDURE — 99999 PR PBB SHADOW E&M-EST. PATIENT-LVL III: CPT | Mod: PBBFAC,,, | Performed by: PHYSICIAN ASSISTANT

## 2022-05-03 PROCEDURE — 3078F PR MOST RECENT DIASTOLIC BLOOD PRESSURE < 80 MM HG: ICD-10-PCS | Mod: CPTII,S$GLB,, | Performed by: PHYSICIAN ASSISTANT

## 2022-05-03 RX ORDER — FENTANYL 25 UG/1
1 PATCH TRANSDERMAL
Qty: 10 PATCH | Refills: 0 | Status: SHIPPED | OUTPATIENT
Start: 2022-07-25 | End: 2022-08-24

## 2022-05-03 RX ORDER — HYDROCODONE BITARTRATE AND ACETAMINOPHEN 10; 325 MG/1; MG/1
1 TABLET ORAL EVERY 8 HOURS PRN
Qty: 90 TABLET | Refills: 0 | Status: SHIPPED | OUTPATIENT
Start: 2022-05-06 | End: 2022-06-05

## 2022-05-03 RX ORDER — FENTANYL 25 UG/1
1 PATCH TRANSDERMAL
Qty: 10 PATCH | Refills: 0 | Status: SHIPPED | OUTPATIENT
Start: 2022-05-26 | End: 2022-06-25

## 2022-05-03 RX ORDER — FENTANYL 25 UG/1
1 PATCH TRANSDERMAL
Qty: 10 PATCH | Refills: 0 | Status: SHIPPED | OUTPATIENT
Start: 2022-06-25 | End: 2022-07-25

## 2022-05-03 RX ORDER — HYDROCODONE BITARTRATE AND ACETAMINOPHEN 10; 325 MG/1; MG/1
1 TABLET ORAL EVERY 8 HOURS PRN
Qty: 90 TABLET | Refills: 0 | Status: SHIPPED | OUTPATIENT
Start: 2022-06-05 | End: 2022-07-05

## 2022-05-03 RX ORDER — HYDROCODONE BITARTRATE AND ACETAMINOPHEN 10; 325 MG/1; MG/1
1 TABLET ORAL EVERY 8 HOURS PRN
Qty: 90 TABLET | Refills: 0 | Status: SHIPPED | OUTPATIENT
Start: 2022-07-05 | End: 2022-08-03 | Stop reason: SDUPTHER

## 2022-05-03 NOTE — PROGRESS NOTES
This note was completed with dictation software and grammatical errors may exist.    CC:  Back pain, neck pain, phantom limb pain    HPI:  The patient is a 77-year-old man with a history of bladder CA status post cystectomy also requiring left leg amputation, chronic low back pain with multiple lumbar surgeries who presents in referral from [facility] for continued pain.  He returns in follow-up today with back pain and left phantom limb pain.  Since his last visit he was admitted to [facility] for nephrolithiasis, acute renal failure and sepsis.  He reports being in a nursing facility for several weeks.  His medication was reduced and he was on a fentanyl 25 microgram/hour patch without any oral pain medication other than Tylenol.  He reports having severe worsening phantom limb pain during that time.  Since he has been out of the facility and back home Dr. Renae has sent hydrocodone for him which works as well as Percocet.  He is taking this 3 times a day in addition to the fentanyl 25 microgram/hour patch with adequate relief of his pain at this time.    Previous history:  The patient has had a total of 4 back surgeries with fusions, laminectomies, continued right leg radicular pain throughout the entire extremity and foot.  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  In the past year he had a revision of his right knee replacement but had an infection and required a spacer being placed, long-term antibiotics.  He has been recovering from this and has consulted with Neurosurgery about his back and leg pain and they do not feel that he is a surgical candidate at this point.  His pain is located across the bilateral buttocks and throughout the entire right leg.  He describes it as aching, burning, grabbing, sharp and electric.  It is worse with prolonged sitting, standing, lying down or getting out of a bed or a chair.  He does get some relief with  medications. He has been taking Percocet 10/325 up to 3 times a day with about 75% relief in addition to fentanyl 50 mcg/hour.  He denies any new fevers or chills, no new weakness.    Pain intervention history:  He had tried multiple injections in the past, the last time was over 10 years ago.  He is status post C7-T1 interlaminar epidural steroid injection on 11/17/2020 with 50% relief.      Spine surgeries: total of 4 back surgeries with fusions, laminectomies    Antineuropathics:  He has tried gabapentin in the past but had side effects.  He has not tried Topamax(has a history of renal calculi). He had tried using the Lyrica 50 mg nightly for a week and states that he has had jerking of his extremities which is the same thing that happened with gabapentin but not as severe.   NSAIDs:  Physical therapy: Outpatient PT in past, [facility]  Antidepressants:  Muscle relaxers:  Opioids:  Hydrocodone-acetaminophen 10/325 mg 3 times daily as needed, fentanyl 25 micrograms/hour  Antiplatelets/Anticoagulants:    Lab Results   Component Value Date    HGBA1C 5.5 01/03/2019         ROS:  He reports fatigability, weight gain, itching, shortness of breath, constipation, easy bruising, bleeding, history of kidney stones, joint stiffness, joint swelling, back pain, difficulty sleeping, depression and loss of balance.  Balance of review of systems is negative.    Past Medical History:   Diagnosis Date    Arthritis     Cancer 2012    left leg amputated, bladder     Encounter for blood transfusion     Hypertension     Presence of urostomy     Sleep apnea with use of continuous positive airway pressure (CPAP)     Wheelchair dependent        Past Surgical History:   Procedure Laterality Date    BACK SURGERY      4 surgeries    CARDIOVERSION      x 2    CARPAL TUNNEL RELEASE Bilateral     COLON SURGERY      COLONOSCOPY  2014    COLOSTOMY      COLOSTOMY CLOSURE      EPIDURAL STEROID INJECTION INTO CERVICAL SPINE N/A 11/17/2020  "   Procedure: Injection-steroid-epidural-cervical, C7-T1;  Surgeon: Felipe Renae MD;  Location: [facility] OR;  Service: Pain Management;  Laterality: N/A;    JOINT REPLACEMENT Right     TKA    JOINT REPLACEMENT Right 2018    Spacer exchange TKA/lateral release/Exc bony overgrowth patella    JOINT REPLACEMENT Right 2019    Stage 2 revision, all 3 components/Open lateral release.    LEG AMPUTATION AT HIP Left 2012    MEDIPORT REMOVAL Left 2018    Procedure: REMOVAL, CATHETER, CENTRAL VENOUS, TUNNELED, WITH PORT;  Surgeon: Stu Godinez MD;  Location: [facility] OR;  Service: General;  Laterality: Left;    NECK SURGERY      2 surgeries    PORTACATH PLACEMENT      REVISION OF KNEE ARTHROPLASTY Right 2018    Procedure: REVISION, ARTHROPLASTY, KNEE-stage one;  Surgeon: Dillon Peacock MD;  Location: [facility] OR;  Service: Orthopedics;  Laterality: Right;    REVISION OF KNEE ARTHROPLASTY Right 2019    Procedure: REVISION, ARTHROPLASTY, KNEE STAGE 2-OPEN LATERAL RELEASE, WITH REMOVAL OF IMPLANTS.;  Surgeon: Dillon Peacock MD;  Location: [facility];  Service: Orthopedics;  Laterality: Right;    TOTAL KNEE ARTHROPLASTY Right        Social History     Socioeconomic History    Marital status:    Tobacco Use    Smoking status: Former Smoker     Packs/day: 1.00     Types: Cigarettes     Quit date: 2000     Years since quittin.3    Smokeless tobacco: Former User    Tobacco comment: quit 18 years ago    Substance and Sexual Activity    Alcohol use: No    Drug use: No    Sexual activity: Never         Medications/Allergies: See med card    Vitals:    22 0833   BP: (!) 114/54   Pulse: 65   Weight: 130 kg (286 lb 9.6 oz)   Height: 5' 10.98" (1.803 m)   PainSc:   5   PainLoc: Back         Physical exam:  Gen: A and O x3, pleasant, well-groomed  Skin: No rashes or obvious lesions  HEENT: PERRLA, no obvious deformities on ears or in canals. Trachea midline.  CVS: Regular " rate and rhythm, normal palpable pulses.  Resp:No increased work of breathing, symmetrical chest rise.  Abdomen: Soft, NT/ND.  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     Neuro:  Upper extremities: 5/5 strength bilaterally   Lower extremities: 5/5 strength right side  Reflexes: Brachioradialis 2+, Bicep 2+, Tricep 2+. Patellar 0+, Achilles 0+ right side  Sensory:  Intact and symmetrical to light touch and pinprick in C2-T1 dermatomes bilaterally. Right leg: Intact and symmetrical to light touch and pinprick in L2-S1 dermatomes with some decreased sensation to light touch and pinprick throughout the entire leg.    Cervical Spine:  Cervical spine: ROM is full in flexion, extension and lateral rotation without increased pain.  Spurling's maneuver causes no neck pain to either side.  Myofascial exam: No Tenderness to palpation across cervical paraspinous region bilaterally.    Lumbar spine:  Lumbar spine:  Range of motion not tested, unable to stand without assistance.  Chico's test causes no increased pain on either side.    Supine straight leg raise is negative on the right side.  Internal and external rotation of the hip causes no increased pain on either side.  Myofascial exam: No tenderness to palpation across lumbar paraspinous muscles.    Tinel's test positive on the left and Phalen's positive on the right.    Imagin19 Xray Thoracolumbar spine:  Osseous demineralization is noted diffusely.  L1, L2, L3, and L4 posterior pedicle screw fusion is suspected with intervertebral disc prostheses at the L2-L3 and L3-L4 levels.  Intervertebral disc height loss is noted at all lumbar levels.  Vertebral body heights are suboptimally evaluated secondary to poor penetration on the lateral likely secondary to body habitus.  Atherosclerotic calcifications are noted within the aorta.       Assessment:   The patient is a 77-year-old man with a history of bladder CA status post  cystectomy also requiring left leg amputation, chronic low back pain with multiple lumbar surgeries who presents in referral from [facility] for continued pain.    1. Chronic pain syndrome     2. Postlaminectomy syndrome of lumbar region     3. Phantom limb pain     4. Opioid use agreement exists         Plan:  1. Dr. Renae provided prescriptions for hydrocodone-acetaminophen 10/325 mg up to 3 times a day as needed for pain and fentanyl 25 micrograms/hour.  I have reviewed the [place] Board of Pharmacy website and there are no abberancies.    2. Follow-up in 3 months or sooner as needed.

## 2022-06-27 ENCOUNTER — TELEPHONE (OUTPATIENT)
Dept: PAIN MEDICINE | Facility: CLINIC | Age: 77
End: 2022-06-27
Payer: MEDICARE

## 2022-06-27 NOTE — TELEPHONE ENCOUNTER
Please let the patient know that we have discussed his case and due to his hospitalization this year we believe it is safer for him to stay on 25 micrograms/hour and this can be discussed further at his next visit.

## 2022-06-27 NOTE — TELEPHONE ENCOUNTER
----- Message from Sarah Holder sent at 6/27/2022 10:54 AM CDT -----  Contact: Patient  Type: Patient Call Back         Who called: Patient         What is the request in detail: calling regarding fentaNYL (DURAGESIC) 25 mcg/hr Rx; states this was the wrong strength that was sent in; state sit was suppose to be 50mcg; requesting tos peak with staff; please advise         Can the clinic reply by [facility]? call         Would the patient rather a call back or a response via [facility]? call         Best call back number: [phone]         Additional Information:   [facility] [address]  Phone: [phone] Fax: [phone]             Thank You

## 2022-08-03 ENCOUNTER — OFFICE VISIT (OUTPATIENT)
Dept: PAIN MEDICINE | Facility: CLINIC | Age: 77
End: 2022-08-03
Payer: MEDICARE

## 2022-08-03 VITALS
DIASTOLIC BLOOD PRESSURE: 65 MMHG | BODY MASS INDEX: 39.99 KG/M2 | HEIGHT: 71 IN | HEART RATE: 56 BPM | OXYGEN SATURATION: 92 % | SYSTOLIC BLOOD PRESSURE: 141 MMHG

## 2022-08-03 DIAGNOSIS — G54.6 PHANTOM LIMB PAIN: ICD-10-CM

## 2022-08-03 DIAGNOSIS — M96.1 POSTLAMINECTOMY SYNDROME OF LUMBAR REGION: ICD-10-CM

## 2022-08-03 DIAGNOSIS — Z79.891 OPIOID USE AGREEMENT EXISTS: ICD-10-CM

## 2022-08-03 DIAGNOSIS — G89.29 CHRONIC RIGHT SHOULDER PAIN: Primary | ICD-10-CM

## 2022-08-03 DIAGNOSIS — G89.4 CHRONIC PAIN SYNDROME: ICD-10-CM

## 2022-08-03 DIAGNOSIS — M25.511 CHRONIC RIGHT SHOULDER PAIN: Primary | ICD-10-CM

## 2022-08-03 PROCEDURE — 20610 PR DRAIN/INJECT LARGE JOINT/BURSA: ICD-10-PCS | Mod: RT,S$GLB,, | Performed by: ANESTHESIOLOGY

## 2022-08-03 PROCEDURE — 99214 OFFICE O/P EST MOD 30 MIN: CPT | Mod: 25,S$GLB,, | Performed by: ANESTHESIOLOGY

## 2022-08-03 PROCEDURE — 99214 PR OFFICE/OUTPT VISIT, EST, LEVL IV, 30-39 MIN: ICD-10-PCS | Mod: 25,S$GLB,, | Performed by: ANESTHESIOLOGY

## 2022-08-03 PROCEDURE — 3288F FALL RISK ASSESSMENT DOCD: CPT | Mod: CPTII,S$GLB,, | Performed by: ANESTHESIOLOGY

## 2022-08-03 PROCEDURE — 3077F PR MOST RECENT SYSTOLIC BLOOD PRESSURE >= 140 MM HG: ICD-10-PCS | Mod: CPTII,S$GLB,, | Performed by: ANESTHESIOLOGY

## 2022-08-03 PROCEDURE — 3288F PR FALLS RISK ASSESSMENT DOCUMENTED: ICD-10-PCS | Mod: CPTII,S$GLB,, | Performed by: ANESTHESIOLOGY

## 2022-08-03 PROCEDURE — 1125F AMNT PAIN NOTED PAIN PRSNT: CPT | Mod: CPTII,S$GLB,, | Performed by: ANESTHESIOLOGY

## 2022-08-03 PROCEDURE — 96372 THER/PROPH/DIAG INJ SC/IM: CPT | Mod: S$GLB,,, | Performed by: ANESTHESIOLOGY

## 2022-08-03 PROCEDURE — 3078F PR MOST RECENT DIASTOLIC BLOOD PRESSURE < 80 MM HG: ICD-10-PCS | Mod: CPTII,S$GLB,, | Performed by: ANESTHESIOLOGY

## 2022-08-03 PROCEDURE — 1125F PR PAIN SEVERITY QUANTIFIED, PAIN PRESENT: ICD-10-PCS | Mod: CPTII,S$GLB,, | Performed by: ANESTHESIOLOGY

## 2022-08-03 PROCEDURE — 20610 DRAIN/INJ JOINT/BURSA W/O US: CPT | Mod: RT,S$GLB,, | Performed by: ANESTHESIOLOGY

## 2022-08-03 PROCEDURE — 99999 PR PBB SHADOW E&M-EST. PATIENT-LVL III: ICD-10-PCS | Mod: PBBFAC,,, | Performed by: ANESTHESIOLOGY

## 2022-08-03 PROCEDURE — 1101F PR PT FALLS ASSESS DOC 0-1 FALLS W/OUT INJ PAST YR: ICD-10-PCS | Mod: CPTII,S$GLB,, | Performed by: ANESTHESIOLOGY

## 2022-08-03 PROCEDURE — 3077F SYST BP >= 140 MM HG: CPT | Mod: CPTII,S$GLB,, | Performed by: ANESTHESIOLOGY

## 2022-08-03 PROCEDURE — 96372 PR INJECTION,THERAP/PROPH/DIAG2ST, IM OR SUBCUT: ICD-10-PCS | Mod: S$GLB,,, | Performed by: ANESTHESIOLOGY

## 2022-08-03 PROCEDURE — 1159F PR MEDICATION LIST DOCUMENTED IN MEDICAL RECORD: ICD-10-PCS | Mod: CPTII,S$GLB,, | Performed by: ANESTHESIOLOGY

## 2022-08-03 PROCEDURE — 1101F PT FALLS ASSESS-DOCD LE1/YR: CPT | Mod: CPTII,S$GLB,, | Performed by: ANESTHESIOLOGY

## 2022-08-03 PROCEDURE — 1159F MED LIST DOCD IN RCRD: CPT | Mod: CPTII,S$GLB,, | Performed by: ANESTHESIOLOGY

## 2022-08-03 PROCEDURE — 3078F DIAST BP <80 MM HG: CPT | Mod: CPTII,S$GLB,, | Performed by: ANESTHESIOLOGY

## 2022-08-03 PROCEDURE — 99999 PR PBB SHADOW E&M-EST. PATIENT-LVL III: CPT | Mod: PBBFAC,,, | Performed by: ANESTHESIOLOGY

## 2022-08-03 RX ORDER — HYDROCODONE BITARTRATE AND ACETAMINOPHEN 10; 325 MG/1; MG/1
1 TABLET ORAL EVERY 8 HOURS PRN
Qty: 90 TABLET | Refills: 0 | Status: SHIPPED | OUTPATIENT
Start: 2022-10-03 | End: 2022-11-02 | Stop reason: SDUPTHER

## 2022-08-03 RX ORDER — FENTANYL 50 UG/1
1 PATCH TRANSDERMAL
Qty: 10 PATCH | Refills: 0 | Status: SHIPPED | OUTPATIENT
Start: 2022-08-10 | End: 2022-11-02

## 2022-08-03 RX ORDER — HYDROCODONE BITARTRATE AND ACETAMINOPHEN 10; 325 MG/1; MG/1
1 TABLET ORAL EVERY 8 HOURS PRN
Qty: 90 TABLET | Refills: 0 | Status: SHIPPED | OUTPATIENT
Start: 2022-08-04 | End: 2022-09-03

## 2022-08-03 RX ORDER — HYDROCODONE BITARTRATE AND ACETAMINOPHEN 10; 325 MG/1; MG/1
1 TABLET ORAL EVERY 8 HOURS PRN
Qty: 90 TABLET | Refills: 0 | Status: SHIPPED | OUTPATIENT
Start: 2022-09-03 | End: 2022-10-03

## 2022-08-03 RX ORDER — DEXAMETHASONE SODIUM PHOSPHATE 4 MG/ML
4 INJECTION, SOLUTION INTRA-ARTICULAR; INTRALESIONAL; INTRAMUSCULAR; INTRAVENOUS; SOFT TISSUE
Status: COMPLETED | OUTPATIENT
Start: 2022-08-03 | End: 2022-08-04

## 2022-08-03 NOTE — PROGRESS NOTES
This note was completed with dictation software and grammatical errors may exist.    CC:  Back pain, neck pain, phantom limb pain    HPI:  The patient is a 77-year-old man with a history of bladder CA status post cystectomy also requiring left leg amputation, chronic low back pain with multiple lumbar surgeries who presents in referral from [facility] for continued pain.  Patient returns in follow-up today for back pain, leg pain phantom left leg pain.  Since his last visit he was actually hospitalized at [facility] for kidney stones, required a nephrostomy 2 on the right side, this was removed.  However he was hospitalized for several weeks and became deconditioned and states that his right leg has become weak and this is the only leg that allows him to ambulate.  Thus he has been doing physical therapy, was doing at home and then continued exercise at home and feels that is the leg has strengthen.  He does note severe shoulder pain, right greater than the left pain since he has been doing his leg exercises.  The pain is worse with trying to raise his right arm.    Overall he feels that his pain is much worse in his leg and states that previously when he was on a 50 microgram fentanyl patch, he would often take 2 hydrocodone or even 1 hydrocodone a day, now he is regularly scheduling this 3 times daily.  He denies any other side effects.          Previous history:  The patient has had a total of 4 back surgeries with fusions, laminectomies, continued right leg radicular pain throughout the entire extremity and foot.  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  In the past year he had a revision of his right knee replacement but had an infection and required a spacer being placed, long-term antibiotics.  He has been recovering from this and has consulted with Neurosurgery about his back and leg pain and they do not feel that he is a surgical candidate  at this point.  His pain is located across the bilateral buttocks and throughout the entire right leg.  He describes it as aching, burning, grabbing, sharp and electric.  It is worse with prolonged sitting, standing, lying down or getting out of a bed or a chair.  He does get some relief with medications. He has been taking Percocet 10/325 up to 3 times a day with about 75% relief in addition to fentanyl 50 mcg/hour.  He denies any new fevers or chills, no new weakness.    Pain intervention history:  He had tried multiple injections in the past, the last time was over 10 years ago.  He is status post C7-T1 interlaminar epidural steroid injection on 11/17/2020 with 50% relief.      Spine surgeries: total of 4 back surgeries with fusions, laminectomies    Antineuropathics:  He has tried gabapentin in the past but had side effects.  He has not tried Topamax(has a history of renal calculi). He had tried using the Lyrica 50 mg nightly for a week and states that he has had jerking of his extremities which is the same thing that happened with gabapentin but not as severe.   NSAIDs:  Physical therapy: Outpatient PT in past, [facility]  Antidepressants:  Muscle relaxers:  Opioids:  Hydrocodone-acetaminophen 10/325 mg 3 times daily as needed, fentanyl 25 micrograms/hour  Antiplatelets/Anticoagulants:    Lab Results   Component Value Date    HGBA1C 5.5 01/03/2019         ROS:  He reports fatigability, weight gain, itching, shortness of breath, constipation, easy bruising, bleeding, history of kidney stones, joint stiffness, joint swelling, back pain, difficulty sleeping, depression and loss of balance.  Balance of review of systems is negative.    Past Medical History:   Diagnosis Date    Arthritis     Cancer 2012    left leg amputated, bladder     Encounter for blood transfusion     Hypertension     Presence of urostomy     Sleep apnea with use of continuous positive airway pressure (CPAP)     Wheelchair dependent        Past  Surgical History:   Procedure Laterality Date    BACK SURGERY      4 surgeries    CARDIOVERSION      x 2    CARPAL TUNNEL RELEASE Bilateral     COLON SURGERY      COLONOSCOPY  2014    COLOSTOMY      COLOSTOMY CLOSURE      EPIDURAL STEROID INJECTION INTO CERVICAL SPINE N/A 2020    Procedure: Injection-steroid-epidural-cervical, C7-T1;  Surgeon: Felipe Renae MD;  Location: [facility] OR;  Service: Pain Management;  Laterality: N/A;    JOINT REPLACEMENT Right     TKA    JOINT REPLACEMENT Right 2018    Spacer exchange TKA/lateral release/Exc bony overgrowth patella    JOINT REPLACEMENT Right 2019    Stage 2 revision, all 3 components/Open lateral release.    LEG AMPUTATION AT HIP Left 2012    MEDIPORT REMOVAL Left 2018    Procedure: REMOVAL, CATHETER, CENTRAL VENOUS, TUNNELED, WITH PORT;  Surgeon: Stu Godinez MD;  Location: [facility] OR;  Service: General;  Laterality: Left;    NECK SURGERY      2 surgeries    PORTACATH PLACEMENT      REVISION OF KNEE ARTHROPLASTY Right 2018    Procedure: REVISION, ARTHROPLASTY, KNEE-stage one;  Surgeon: Dillon Peacock MD;  Location: [facility] OR;  Service: Orthopedics;  Laterality: Right;    REVISION OF KNEE ARTHROPLASTY Right 2019    Procedure: REVISION, ARTHROPLASTY, KNEE STAGE 2-OPEN LATERAL RELEASE, WITH REMOVAL OF IMPLANTS.;  Surgeon: Dillon Peacock MD;  Location: [facility] OR;  Service: Orthopedics;  Laterality: Right;    TOTAL KNEE ARTHROPLASTY Right        Social History     Socioeconomic History    Marital status:    Tobacco Use    Smoking status: Former Smoker     Packs/day: 1.00     Types: Cigarettes     Quit date: 2000     Years since quittin.6    Smokeless tobacco: Former User    Tobacco comment: quit 18 years ago    Substance and Sexual Activity    Alcohol use: No    Drug use: No    Sexual activity: Never         Medications/Allergies: See med card    Vitals:    22 0954   BP: (!) 141/65   Pulse:  "(!) 56   SpO2: (!) 92%   Height: 5' 10.98" (1.803 m)   PainSc:   6   PainLoc: Neck         Physical exam:  Gen: A and O x3, pleasant, well-groomed  Skin: No rashes or obvious lesions  HEENT: PERRLA, no obvious deformities on ears or in canals. Trachea midline.  CVS: Regular rate and rhythm, normal palpable pulses.  Resp:No increased work of breathing, symmetrical chest rise.  Abdomen: Soft, NT/ND.  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     Neuro:  Upper extremities: 5/5 strength bilaterally   Lower extremities: 5/5 strength right side  Reflexes: Brachioradialis 2+, Bicep 2+, Tricep 2+. Patellar 0+, Achilles 0+ right side  Sensory:  Intact and symmetrical to light touch and pinprick in C2-T1 dermatomes bilaterally. Right leg: Intact and symmetrical to light touch and pinprick in L2-S1 dermatomes with some decreased sensation to light touch and pinprick throughout the entire leg.    Cervical Spine:  Cervical spine: ROM is full in flexion, extension and lateral rotation without increased pain.  Spurling's maneuver causes no neck pain to either side.  Myofascial exam: No Tenderness to palpation across cervical paraspinous region bilaterally.    Lumbar spine:  Lumbar spine:  Range of motion not tested, unable to stand without assistance.  Chico's test causes no increased pain on either side.    Supine straight leg raise is negative on the right side.  Internal and external rotation of the hip causes no increased pain on either side.  Myofascial exam: No tenderness to palpation across lumbar paraspinous muscles.    Right shoulder exam:  Severe increased pain with empty can test, internal external rotation against resistance, positive Conh test.    Imagin19 Xray Thoracolumbar spine:  Osseous demineralization is noted diffusely.  L1, L2, L3, and L4 posterior pedicle screw fusion is suspected with intervertebral disc prostheses at the L2-L3 and L3-L4 levels.  Intervertebral " disc height loss is noted at all lumbar levels.  Vertebral body heights are suboptimally evaluated secondary to poor penetration on the lateral likely secondary to body habitus.  Atherosclerotic calcifications are noted within the aorta.       Assessment:   The patient is a 77-year-old man with a history of bladder CA status post cystectomy also requiring left leg amputation, chronic low back pain with multiple lumbar surgeries who presents in referral from [facility] for continued pain.    1. Chronic right shoulder pain     2. Chronic pain syndrome     3. Phantom limb pain     4. Postlaminectomy syndrome of lumbar region     5. Opioid use agreement exists         Plan:  1.  For his right shoulder pain, we discussed that this is likely due to the exercise that he is doing for his leg, use using a band with his shoulders.  He requested injection today, I performed this as noted below.  We discussed that if this is not producing long-term relief, he may need to go to physical therapy for her shoulders.  2. I have refilled his hydrocodone for up to 3 times a day for the next several months.  I agreed to increase his fentanyl patch back to 50 microgram/hour and since he is having worsened phantom limb pain where he has to be several times a week now it is daily and much worse.  We discussed side effects, concerns for this medication, I have only given him 1 month for this and I have asked him to contact me if this is helping or not  3. I have scheduled him for follow-up in 3 months or sooner as needed.          Procedure: Right Shoulder injection  Diagnosis: right Shoulder pain  We reviewed informed consent, discussed risks, signed.  The right shoulder was prepped with ChloraPrep and using a 27-gauge 1.5 inch needle was placed at the posterior subacromial entrance of the shoulder joint, negative aspiration for blood or fluid, and 4mg of dexamethasone and 1 mL of 0.25% bupivacaine were instilled.  The  needle was removed and a bandage applied.  The patient tolerated the procedure well.

## 2022-08-04 RX ADMIN — DEXAMETHASONE SODIUM PHOSPHATE 4 MG: 4 INJECTION, SOLUTION INTRA-ARTICULAR; INTRALESIONAL; INTRAMUSCULAR; INTRAVENOUS; SOFT TISSUE at 04:08

## 2022-09-16 ENCOUNTER — TELEPHONE (OUTPATIENT)
Dept: PAIN MEDICINE | Facility: CLINIC | Age: 77
End: 2022-09-16
Payer: MEDICARE

## 2022-09-16 NOTE — TELEPHONE ENCOUNTER
Call placed to Pt who states that his pharm no longer caries the 50 mg Fentanyl patches. He needs a Rx for the 25 mg to be sent to [facility]. Message forwarded to  for review.

## 2022-09-16 NOTE — TELEPHONE ENCOUNTER
----- Message from Heber Lebron sent at 9/16/2022  8:14 AM CDT -----  Contact: pt at  [phone]  Type: Needs Medical Advice  Who Called:  pt  Best Call Back Number: [phone]  Additional Information: pt is calling the office requesting to speak to the nurse. Please call back and advise.

## 2022-09-18 RX ORDER — FENTANYL 25 UG/1
1 PATCH TRANSDERMAL
Qty: 10 PATCH | Refills: 0 | Status: SHIPPED | OUTPATIENT
Start: 2022-09-18 | End: 2022-10-18

## 2022-09-19 NOTE — TELEPHONE ENCOUNTER
Call placed to Pt to inform that his Fentanyl Patches 25 mcg had been sent in to [facility]'s by Dr. Renae as requested.   Pt verbalized understanding.

## 2022-11-02 ENCOUNTER — OFFICE VISIT (OUTPATIENT)
Dept: PAIN MEDICINE | Facility: CLINIC | Age: 77
End: 2022-11-02
Payer: MEDICARE

## 2022-11-02 VITALS
DIASTOLIC BLOOD PRESSURE: 65 MMHG | OXYGEN SATURATION: 96 % | HEIGHT: 71 IN | HEART RATE: 51 BPM | BODY MASS INDEX: 39.99 KG/M2 | SYSTOLIC BLOOD PRESSURE: 146 MMHG

## 2022-11-02 DIAGNOSIS — Z79.891 OPIOID USE AGREEMENT EXISTS: ICD-10-CM

## 2022-11-02 DIAGNOSIS — M96.1 POSTLAMINECTOMY SYNDROME OF LUMBAR REGION: ICD-10-CM

## 2022-11-02 DIAGNOSIS — G89.4 CHRONIC PAIN SYNDROME: ICD-10-CM

## 2022-11-02 DIAGNOSIS — M25.511 CHRONIC RIGHT SHOULDER PAIN: Primary | ICD-10-CM

## 2022-11-02 DIAGNOSIS — M51.36 DDD (DEGENERATIVE DISC DISEASE), LUMBAR: Primary | ICD-10-CM

## 2022-11-02 DIAGNOSIS — G54.6 PHANTOM LIMB PAIN: ICD-10-CM

## 2022-11-02 DIAGNOSIS — G89.29 CHRONIC RIGHT SHOULDER PAIN: Primary | ICD-10-CM

## 2022-11-02 PROCEDURE — 99214 PR OFFICE/OUTPT VISIT, EST, LEVL IV, 30-39 MIN: ICD-10-PCS | Mod: S$GLB,,, | Performed by: PHYSICIAN ASSISTANT

## 2022-11-02 PROCEDURE — 1159F MED LIST DOCD IN RCRD: CPT | Mod: CPTII,S$GLB,, | Performed by: PHYSICIAN ASSISTANT

## 2022-11-02 PROCEDURE — 99999 PR PBB SHADOW E&M-EST. PATIENT-LVL IV: ICD-10-PCS | Mod: PBBFAC,,, | Performed by: PHYSICIAN ASSISTANT

## 2022-11-02 PROCEDURE — 3288F PR FALLS RISK ASSESSMENT DOCUMENTED: ICD-10-PCS | Mod: CPTII,S$GLB,, | Performed by: PHYSICIAN ASSISTANT

## 2022-11-02 PROCEDURE — 1160F PR REVIEW ALL MEDS BY PRESCRIBER/CLIN PHARMACIST DOCUMENTED: ICD-10-PCS | Mod: CPTII,S$GLB,, | Performed by: PHYSICIAN ASSISTANT

## 2022-11-02 PROCEDURE — 1125F PR PAIN SEVERITY QUANTIFIED, PAIN PRESENT: ICD-10-PCS | Mod: CPTII,S$GLB,, | Performed by: PHYSICIAN ASSISTANT

## 2022-11-02 PROCEDURE — 3078F DIAST BP <80 MM HG: CPT | Mod: CPTII,S$GLB,, | Performed by: PHYSICIAN ASSISTANT

## 2022-11-02 PROCEDURE — 3077F SYST BP >= 140 MM HG: CPT | Mod: CPTII,S$GLB,, | Performed by: PHYSICIAN ASSISTANT

## 2022-11-02 PROCEDURE — 99999 PR PBB SHADOW E&M-EST. PATIENT-LVL IV: CPT | Mod: PBBFAC,,, | Performed by: PHYSICIAN ASSISTANT

## 2022-11-02 PROCEDURE — 1160F RVW MEDS BY RX/DR IN RCRD: CPT | Mod: CPTII,S$GLB,, | Performed by: PHYSICIAN ASSISTANT

## 2022-11-02 PROCEDURE — 1101F PT FALLS ASSESS-DOCD LE1/YR: CPT | Mod: CPTII,S$GLB,, | Performed by: PHYSICIAN ASSISTANT

## 2022-11-02 PROCEDURE — 3078F PR MOST RECENT DIASTOLIC BLOOD PRESSURE < 80 MM HG: ICD-10-PCS | Mod: CPTII,S$GLB,, | Performed by: PHYSICIAN ASSISTANT

## 2022-11-02 PROCEDURE — 1159F PR MEDICATION LIST DOCUMENTED IN MEDICAL RECORD: ICD-10-PCS | Mod: CPTII,S$GLB,, | Performed by: PHYSICIAN ASSISTANT

## 2022-11-02 PROCEDURE — 1125F AMNT PAIN NOTED PAIN PRSNT: CPT | Mod: CPTII,S$GLB,, | Performed by: PHYSICIAN ASSISTANT

## 2022-11-02 PROCEDURE — 3077F PR MOST RECENT SYSTOLIC BLOOD PRESSURE >= 140 MM HG: ICD-10-PCS | Mod: CPTII,S$GLB,, | Performed by: PHYSICIAN ASSISTANT

## 2022-11-02 PROCEDURE — 1101F PR PT FALLS ASSESS DOC 0-1 FALLS W/OUT INJ PAST YR: ICD-10-PCS | Mod: CPTII,S$GLB,, | Performed by: PHYSICIAN ASSISTANT

## 2022-11-02 PROCEDURE — 99214 OFFICE O/P EST MOD 30 MIN: CPT | Mod: S$GLB,,, | Performed by: PHYSICIAN ASSISTANT

## 2022-11-02 PROCEDURE — 3288F FALL RISK ASSESSMENT DOCD: CPT | Mod: CPTII,S$GLB,, | Performed by: PHYSICIAN ASSISTANT

## 2022-11-02 RX ORDER — FENTANYL 50 UG/1
1 PATCH TRANSDERMAL
Qty: 10 PATCH | Refills: 0 | Status: SHIPPED | OUTPATIENT
Start: 2022-11-19 | End: 2022-12-19

## 2022-11-02 RX ORDER — ACETAMINOPHEN 325 MG/1
975 TABLET ORAL EVERY 6 HOURS PRN
COMMUNITY
Start: 2022-03-09

## 2022-11-02 RX ORDER — HYDROCODONE BITARTRATE AND ACETAMINOPHEN 10; 325 MG/1; MG/1
1 TABLET ORAL EVERY 8 HOURS PRN
Qty: 90 TABLET | Refills: 0 | Status: SHIPPED | OUTPATIENT
Start: 2022-12-03 | End: 2023-01-02

## 2022-11-02 RX ORDER — OXYCODONE AND ACETAMINOPHEN 10; 325 MG/1; MG/1
TABLET ORAL
COMMUNITY
Start: 2022-04-19 | End: 2023-01-31

## 2022-11-02 RX ORDER — ERGOCALCIFEROL 1.25 MG/1
CAPSULE ORAL
COMMUNITY

## 2022-11-02 RX ORDER — MELOXICAM 15 MG/1
15 TABLET ORAL
COMMUNITY

## 2022-11-02 RX ORDER — FENTANYL 50 UG/1
PATCH TRANSDERMAL
COMMUNITY
End: 2022-11-02

## 2022-11-02 RX ORDER — HYDROCODONE BITARTRATE AND ACETAMINOPHEN 10; 325 MG/1; MG/1
1 TABLET ORAL EVERY 8 HOURS PRN
Qty: 90 TABLET | Refills: 0 | Status: SHIPPED | OUTPATIENT
Start: 2022-11-03 | End: 2022-12-03

## 2022-11-02 RX ORDER — FENTANYL 25 UG/1
PATCH TRANSDERMAL
COMMUNITY
End: 2022-11-02

## 2022-11-02 RX ORDER — HYDROCODONE BITARTRATE AND ACETAMINOPHEN 10; 325 MG/1; MG/1
1 TABLET ORAL EVERY 8 HOURS PRN
Qty: 90 TABLET | Refills: 0 | Status: SHIPPED | OUTPATIENT
Start: 2023-01-02 | End: 2023-01-31 | Stop reason: SDUPTHER

## 2022-11-02 RX ORDER — FENTANYL 50 UG/1
1 PATCH TRANSDERMAL
Qty: 10 PATCH | Refills: 0 | Status: SHIPPED | OUTPATIENT
Start: 2023-01-18 | End: 2023-01-31 | Stop reason: SDUPTHER

## 2022-11-02 RX ORDER — ASPIRIN 81 MG/1
81 TABLET ORAL
COMMUNITY

## 2022-11-02 RX ORDER — HYDROCHLOROTHIAZIDE 12.5 MG/1
TABLET ORAL
COMMUNITY
Start: 2022-04-13

## 2022-11-02 RX ORDER — FENTANYL 50 UG/1
1 PATCH TRANSDERMAL
Qty: 10 PATCH | Refills: 0 | Status: SHIPPED | OUTPATIENT
Start: 2022-12-19 | End: 2023-01-18

## 2022-11-02 RX ORDER — POTASSIUM CITRATE 10 MEQ/1
TABLET, EXTENDED RELEASE ORAL
COMMUNITY
Start: 2022-01-10

## 2022-11-02 RX ORDER — GABAPENTIN 800 MG/1
300 TABLET ORAL 2 TIMES DAILY
COMMUNITY
End: 2022-11-14

## 2022-11-02 NOTE — TELEPHONE ENCOUNTER
Pt seen in clinic. Please send Rx.    I have reviewed the [place] Board of Pharmacy website and there are no abberancies.

## 2022-11-07 NOTE — PROGRESS NOTES
This note was completed with dictation software and grammatical errors may exist.    CC:  Back pain, neck pain, phantom limb pain    HPI:  The patient is a 77-year-old man with a history of bladder CA status post cystectomy also requiring left leg amputation, chronic low back pain with multiple lumbar surgeries who presents in referral from [facility] for continued pain.  He returns in follow-up today with continued left mainly as well as right shoulder pain.  He states the shoulder pain has been present for 6-8 months and denies lasting relief with the right shoulder injection that Dr. Renae gave him during last visit several months ago.  He states his phantom limb pain is getting worse and he does not feel that his medicine provide sufficient relief.    Previous history:  The patient has had a total of 4 back surgeries with fusions, laminectomies, continued right leg radicular pain throughout the entire extremity and foot.  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  In the past year he had a revision of his right knee replacement but had an infection and required a spacer being placed, long-term antibiotics.  He has been recovering from this and has consulted with Neurosurgery about his back and leg pain and they do not feel that he is a surgical candidate at this point.  His pain is located across the bilateral buttocks and throughout the entire right leg.  He describes it as aching, burning, grabbing, sharp and electric.  It is worse with prolonged sitting, standing, lying down or getting out of a bed or a chair.  He does get some relief with medications. He has been taking Percocet 10/325 up to 3 times a day with about 75% relief in addition to fentanyl 50 mcg/hour.  He denies any new fevers or chills, no new weakness.    Pain intervention history:  He had tried multiple injections in the past, the last time was over 10 years ago.  He is status post C7-T1  interlaminar epidural steroid injection on 11/17/2020 with 50% relief.      Spine surgeries: total of 4 back surgeries with fusions, laminectomies    Antineuropathics:  He has tried gabapentin in the past but had side effects.  He has not tried Topamax(has a history of renal calculi). He had tried using the Lyrica 50 mg nightly for a week and states that he has had jerking of his extremities which is the same thing that happened with gabapentin but not as severe.   NSAIDs:  Physical therapy: Outpatient PT in past, [facility]  Antidepressants:  Muscle relaxers:  Opioids:  Hydrocodone-acetaminophen 10/325 mg 3 times daily as needed, fentanyl 25 micrograms/hour  Antiplatelets/Anticoagulants:    Lab Results   Component Value Date    HGBA1C 5.5 01/03/2019         ROS:  He reports fatigability, weight gain, itching, shortness of breath, constipation, easy bruising, bleeding, history of kidney stones, joint stiffness, joint swelling, back pain, difficulty sleeping, depression and loss of balance.  Balance of review of systems is negative.    Past Medical History:   Diagnosis Date    Arthritis     Cancer 2012    left leg amputated, bladder     Encounter for blood transfusion     Hypertension     Presence of urostomy     Sleep apnea with use of continuous positive airway pressure (CPAP)     Wheelchair dependent        Past Surgical History:   Procedure Laterality Date    BACK SURGERY      4 surgeries    CARDIOVERSION      x 2    CARPAL TUNNEL RELEASE Bilateral     COLON SURGERY      COLONOSCOPY  2014    COLOSTOMY      COLOSTOMY CLOSURE      EPIDURAL STEROID INJECTION INTO CERVICAL SPINE N/A 11/17/2020    Procedure: Injection-steroid-epidural-cervical, C7-T1;  Surgeon: Felipe Rneae MD;  Location: [facility] OR;  Service: Pain Management;  Laterality: N/A;    JOINT REPLACEMENT Right 2007    TKA    JOINT REPLACEMENT Right 02/21/2018    Spacer exchange TKA/lateral release/Exc bony overgrowth patella    JOINT REPLACEMENT Right  "2019    Stage 2 revision, all 3 components/Open lateral release.    LEG AMPUTATION AT HIP Left 2012    MEDIPORT REMOVAL Left 2018    Procedure: REMOVAL, CATHETER, CENTRAL VENOUS, TUNNELED, WITH PORT;  Surgeon: Stu Godinez MD;  Location: [facility];  Service: General;  Laterality: Left;    NECK SURGERY      2 surgeries    PORTACATH PLACEMENT      REVISION OF KNEE ARTHROPLASTY Right 2018    Procedure: REVISION, ARTHROPLASTY, KNEE-stage one;  Surgeon: Dillon Peacock MD;  Location: [facility] OR;  Service: Orthopedics;  Laterality: Right;    REVISION OF KNEE ARTHROPLASTY Right 2019    Procedure: REVISION, ARTHROPLASTY, KNEE STAGE 2-OPEN LATERAL RELEASE, WITH REMOVAL OF IMPLANTS.;  Surgeon: Dillon Peacock MD;  Location: [facility] OR;  Service: Orthopedics;  Laterality: Right;    TOTAL KNEE ARTHROPLASTY Right        Social History     Socioeconomic History    Marital status:    Tobacco Use    Smoking status: Former     Packs/day: 1.00     Types: Cigarettes     Quit date: 2000     Years since quittin.8    Smokeless tobacco: Former    Tobacco comments:     quit 18 years ago    Substance and Sexual Activity    Alcohol use: No    Drug use: No    Sexual activity: Never         Medications/Allergies: See med card    Vitals:    22 1016   BP: (!) 146/65   Pulse: (!) 51   SpO2: 96%   Height: 5' 10.98" (1.803 m)   PainSc:   4   PainLoc: Shoulder         Physical exam:  Gen: A and O x3, pleasant, well-groomed  Skin: No rashes or obvious lesions  HEENT: PERRLA, no obvious deformities on ears or in canals. Trachea midline.  CVS: Regular rate and rhythm, normal palpable pulses.  Resp:No increased work of breathing, symmetrical chest rise.  Abdomen: Soft, NT/ND.  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     Neuro:  Upper extremities: 5/5 strength bilaterally   Lower extremities: 5/5 strength right side  Reflexes: Brachioradialis 2+, Bicep 2+, Tricep 2+. Patellar 0+, " Achilles 0+ right side  Sensory:  Intact and symmetrical to light touch and pinprick in C2-T1 dermatomes bilaterally. Right leg: Intact and symmetrical to light touch and pinprick in L2-S1 dermatomes with some decreased sensation to light touch and pinprick throughout the entire leg.    Cervical Spine:  Cervical spine: ROM is full in flexion, extension and lateral rotation without increased pain.  Spurling's maneuver causes no neck pain to either side.  Myofascial exam: No Tenderness to palpation across cervical paraspinous region bilaterally.    Lumbar spine:  Lumbar spine:  Range of motion not tested, unable to stand without assistance.  Chico's test causes no increased pain on either side.    Supine straight leg raise is negative on the right side.  Internal and external rotation of the hip causes no increased pain on either side.  Myofascial exam: No tenderness to palpation across lumbar paraspinous muscles.    Right shoulder exam:  Severe increased pain with empty can test, internal external rotation against resistance, positive Cohn test.    Imagin19 Xray Thoracolumbar spine:  Osseous demineralization is noted diffusely.  L1, L2, L3, and L4 posterior pedicle screw fusion is suspected with intervertebral disc prostheses at the L2-L3 and L3-L4 levels.  Intervertebral disc height loss is noted at all lumbar levels.  Vertebral body heights are suboptimally evaluated secondary to poor penetration on the lateral likely secondary to body habitus.  Atherosclerotic calcifications are noted within the aorta.       Assessment:   The patient is a 77-year-old man with a history of bladder CA status post cystectomy also requiring left leg amputation, chronic low back pain with multiple lumbar surgeries who presents in referral from [facility] for continued pain.    1. Chronic right shoulder pain  Ambulatory referral/consult to Orthopedics      2. Chronic pain syndrome        3. Phantom limb  pain        4. Postlaminectomy syndrome of lumbar region        5. Opioid use agreement exists            Plan:  1. Dr. Renae provided prescriptions for fentanyl 50 micro grams per hour and hydrocodone-acetaminophen 10/325 mg 3 times daily as needed for pain.  We discussed the concept of tolerance and we do not advise increasing medication.  We discussed spinal cord stimulation as an option to treat his phantom limb pain, however there is an increased risk of infection due to his history of infection.  We will only consider this if absolutely necessary.  2. For his right shoulder pain I have placed a referral to Dr. Narayan.   3. Follow-up in 3 months or sooner as needed.

## 2023-01-31 ENCOUNTER — OFFICE VISIT (OUTPATIENT)
Dept: PAIN MEDICINE | Facility: CLINIC | Age: 78
End: 2023-01-31
Payer: MEDICARE

## 2023-01-31 VITALS
DIASTOLIC BLOOD PRESSURE: 67 MMHG | HEART RATE: 67 BPM | SYSTOLIC BLOOD PRESSURE: 140 MMHG | BODY MASS INDEX: 39.75 KG/M2 | HEIGHT: 71 IN

## 2023-01-31 DIAGNOSIS — M25.511 CHRONIC RIGHT SHOULDER PAIN: ICD-10-CM

## 2023-01-31 DIAGNOSIS — G54.6 PHANTOM LIMB PAIN: ICD-10-CM

## 2023-01-31 DIAGNOSIS — Z79.891 OPIOID USE AGREEMENT EXISTS: ICD-10-CM

## 2023-01-31 DIAGNOSIS — G89.4 CHRONIC PAIN SYNDROME: Primary | ICD-10-CM

## 2023-01-31 DIAGNOSIS — M51.36 DDD (DEGENERATIVE DISC DISEASE), LUMBAR: ICD-10-CM

## 2023-01-31 DIAGNOSIS — M96.1 POSTLAMINECTOMY SYNDROME OF LUMBAR REGION: ICD-10-CM

## 2023-01-31 DIAGNOSIS — G89.29 CHRONIC RIGHT SHOULDER PAIN: ICD-10-CM

## 2023-01-31 PROCEDURE — 1160F RVW MEDS BY RX/DR IN RCRD: CPT | Mod: CPTII,S$GLB,, | Performed by: PHYSICIAN ASSISTANT

## 2023-01-31 PROCEDURE — 3078F PR MOST RECENT DIASTOLIC BLOOD PRESSURE < 80 MM HG: ICD-10-PCS | Mod: CPTII,S$GLB,, | Performed by: PHYSICIAN ASSISTANT

## 2023-01-31 PROCEDURE — 3078F DIAST BP <80 MM HG: CPT | Mod: CPTII,S$GLB,, | Performed by: PHYSICIAN ASSISTANT

## 2023-01-31 PROCEDURE — 3077F SYST BP >= 140 MM HG: CPT | Mod: CPTII,S$GLB,, | Performed by: PHYSICIAN ASSISTANT

## 2023-01-31 PROCEDURE — 1125F PR PAIN SEVERITY QUANTIFIED, PAIN PRESENT: ICD-10-PCS | Mod: CPTII,S$GLB,, | Performed by: PHYSICIAN ASSISTANT

## 2023-01-31 PROCEDURE — 3077F PR MOST RECENT SYSTOLIC BLOOD PRESSURE >= 140 MM HG: ICD-10-PCS | Mod: CPTII,S$GLB,, | Performed by: PHYSICIAN ASSISTANT

## 2023-01-31 PROCEDURE — 1159F MED LIST DOCD IN RCRD: CPT | Mod: CPTII,S$GLB,, | Performed by: PHYSICIAN ASSISTANT

## 2023-01-31 PROCEDURE — 1125F AMNT PAIN NOTED PAIN PRSNT: CPT | Mod: CPTII,S$GLB,, | Performed by: PHYSICIAN ASSISTANT

## 2023-01-31 PROCEDURE — 1101F PT FALLS ASSESS-DOCD LE1/YR: CPT | Mod: CPTII,S$GLB,, | Performed by: PHYSICIAN ASSISTANT

## 2023-01-31 PROCEDURE — 99214 PR OFFICE/OUTPT VISIT, EST, LEVL IV, 30-39 MIN: ICD-10-PCS | Mod: S$GLB,,, | Performed by: PHYSICIAN ASSISTANT

## 2023-01-31 PROCEDURE — 3288F PR FALLS RISK ASSESSMENT DOCUMENTED: ICD-10-PCS | Mod: CPTII,S$GLB,, | Performed by: PHYSICIAN ASSISTANT

## 2023-01-31 PROCEDURE — 99214 OFFICE O/P EST MOD 30 MIN: CPT | Mod: S$GLB,,, | Performed by: PHYSICIAN ASSISTANT

## 2023-01-31 PROCEDURE — 1160F PR REVIEW ALL MEDS BY PRESCRIBER/CLIN PHARMACIST DOCUMENTED: ICD-10-PCS | Mod: CPTII,S$GLB,, | Performed by: PHYSICIAN ASSISTANT

## 2023-01-31 PROCEDURE — 99999 PR PBB SHADOW E&M-EST. PATIENT-LVL IV: CPT | Mod: PBBFAC,,, | Performed by: PHYSICIAN ASSISTANT

## 2023-01-31 PROCEDURE — 1101F PR PT FALLS ASSESS DOC 0-1 FALLS W/OUT INJ PAST YR: ICD-10-PCS | Mod: CPTII,S$GLB,, | Performed by: PHYSICIAN ASSISTANT

## 2023-01-31 PROCEDURE — 3288F FALL RISK ASSESSMENT DOCD: CPT | Mod: CPTII,S$GLB,, | Performed by: PHYSICIAN ASSISTANT

## 2023-01-31 PROCEDURE — 1159F PR MEDICATION LIST DOCUMENTED IN MEDICAL RECORD: ICD-10-PCS | Mod: CPTII,S$GLB,, | Performed by: PHYSICIAN ASSISTANT

## 2023-01-31 PROCEDURE — 99999 PR PBB SHADOW E&M-EST. PATIENT-LVL IV: ICD-10-PCS | Mod: PBBFAC,,, | Performed by: PHYSICIAN ASSISTANT

## 2023-01-31 RX ORDER — FENTANYL 50 UG/1
1 PATCH TRANSDERMAL
Qty: 10 PATCH | Refills: 0 | Status: SHIPPED | OUTPATIENT
Start: 2023-03-19 | End: 2023-04-18

## 2023-01-31 RX ORDER — HYDROCODONE BITARTRATE AND ACETAMINOPHEN 10; 325 MG/1; MG/1
1 TABLET ORAL EVERY 8 HOURS PRN
Qty: 90 TABLET | Refills: 0 | Status: SHIPPED | OUTPATIENT
Start: 2023-03-04 | End: 2023-04-03

## 2023-01-31 RX ORDER — LANOLIN ALCOHOL/MO/W.PET/CERES
1 CREAM (GRAM) TOPICAL
COMMUNITY
Start: 2023-01-11

## 2023-01-31 RX ORDER — FENTANYL 50 UG/1
1 PATCH TRANSDERMAL
Qty: 10 PATCH | Refills: 0 | Status: SHIPPED | OUTPATIENT
Start: 2023-04-18 | End: 2023-04-27 | Stop reason: SDUPTHER

## 2023-01-31 RX ORDER — HYDROCODONE BITARTRATE AND ACETAMINOPHEN 10; 325 MG/1; MG/1
1 TABLET ORAL EVERY 8 HOURS PRN
Qty: 90 TABLET | Refills: 0 | Status: SHIPPED | OUTPATIENT
Start: 2023-04-03 | End: 2023-04-27 | Stop reason: SDUPTHER

## 2023-01-31 RX ORDER — FENTANYL 50 UG/1
1 PATCH TRANSDERMAL
Qty: 10 PATCH | Refills: 0 | Status: SHIPPED | OUTPATIENT
Start: 2023-02-17 | End: 2023-03-19

## 2023-01-31 RX ORDER — ZOLPIDEM TARTRATE 5 MG/1
TABLET ORAL
COMMUNITY

## 2023-01-31 RX ORDER — AMLODIPINE BESYLATE AND ATORVASTATIN CALCIUM 10; 10 MG/1; MG/1
1 TABLET, FILM COATED ORAL
COMMUNITY

## 2023-01-31 RX ORDER — HYDROCODONE BITARTRATE AND ACETAMINOPHEN 10; 325 MG/1; MG/1
1 TABLET ORAL EVERY 8 HOURS PRN
Qty: 90 TABLET | Refills: 0 | Status: SHIPPED | OUTPATIENT
Start: 2023-02-02 | End: 2023-03-04

## 2023-01-31 NOTE — PROGRESS NOTES
This note was completed with dictation software and grammatical errors may exist.    CC:  Back pain, neck pain, phantom limb pain    HPI:  The patient is a 78-year-old man with a history of bladder CA status post cystectomy also requiring left leg amputation, chronic low back pain with multiple lumbar surgeries who presents in referral from [facility] for continued pain.  He returns in follow-up today with multiple pain complaints.  Since his last visit he has seen Dr. Narayan and received a right glenohumeral joint injection with only 1 day of relief.  He is going to follow-up to discuss any further steps.  He states that he will be getting an electric scooter soon.  He continues to have back pain, buttock pain, right leg pain and left phantom limb pain.  He states that his left phantom limb pain is getting more severe and he would like to consider spinal cord stimulation in the future.  He continues to take pain medicine with mild relief but not sufficient.    Previous history:  The patient has had a total of 4 back surgeries with fusions, laminectomies, continued right leg radicular pain throughout the entire extremity and foot.  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  In the past year he had a revision of his right knee replacement but had an infection and required a spacer being placed, long-term antibiotics.  He has been recovering from this and has consulted with Neurosurgery about his back and leg pain and they do not feel that he is a surgical candidate at this point.  His pain is located across the bilateral buttocks and throughout the entire right leg.  He describes it as aching, burning, grabbing, sharp and electric.  It is worse with prolonged sitting, standing, lying down or getting out of a bed or a chair.  He does get some relief with medications. He has been taking Percocet 10/325 up to 3 times a day with about 75% relief in addition to  fentanyl 50 mcg/hour.  He denies any new fevers or chills, no new weakness.    Pain intervention history:  He had tried multiple injections in the past, the last time was over 10 years ago.  He is status post C7-T1 interlaminar epidural steroid injection on 11/17/2020 with 50% relief.      Spine surgeries: total of 4 back surgeries with fusions, laminectomies    Antineuropathics:  He has tried gabapentin in the past but had side effects.  He has not tried Topamax(has a history of renal calculi). He had tried using the Lyrica 50 mg nightly for a week and states that he has had jerking of his extremities which is the same thing that happened with gabapentin but not as severe.   NSAIDs:  Physical therapy: Outpatient PT in past, [facility]  Antidepressants:  Muscle relaxers:  Opioids:  Hydrocodone-acetaminophen 10/325 mg 3 times daily as needed, fentanyl 25 micrograms/hour  Antiplatelets/Anticoagulants:    Lab Results   Component Value Date    HGBA1C 5.5 01/03/2019         ROS:  He reports fatigability, weight gain, itching, shortness of breath, constipation, easy bruising, bleeding, history of kidney stones, joint stiffness, joint swelling, back pain, difficulty sleeping, depression and loss of balance.  Balance of review of systems is negative.    Past Medical History:   Diagnosis Date    Arthritis     Cancer 2012    left leg amputated, bladder     Chronic neck pain     Chronic pain     Encounter for blood transfusion     Hypertension     Presence of urostomy     Sleep apnea with use of continuous positive airway pressure (CPAP)     Wheelchair dependent        Past Surgical History:   Procedure Laterality Date    BACK SURGERY      4 surgeries    CARDIOVERSION      x 2    CARPAL TUNNEL RELEASE Bilateral     COLON SURGERY      COLONOSCOPY  2014    COLOSTOMY      COLOSTOMY CLOSURE      EPIDURAL STEROID INJECTION INTO CERVICAL SPINE N/A 11/17/2020    Procedure: Injection-steroid-epidural-cervical, C7-T1;  Surgeon: Felipe REIS  "MD Oc;  Location: [facility] OR;  Service: Pain Management;  Laterality: N/A;    JOINT REPLACEMENT Right     TKA    JOINT REPLACEMENT Right 2018    Spacer exchange TKA/lateral release/Exc bony overgrowth patella    JOINT REPLACEMENT Right 2019    Stage 2 revision, all 3 components/Open lateral release.    LEG AMPUTATION AT HIP Left 2012    MEDIPORT REMOVAL Left 2018    Procedure: REMOVAL, CATHETER, CENTRAL VENOUS, TUNNELED, WITH PORT;  Surgeon: Stu Godinez MD;  Location: [facility] OR;  Service: General;  Laterality: Left;    NECK SURGERY      2 surgeries    PORTACATH PLACEMENT      REVISION OF KNEE ARTHROPLASTY Right 2018    Procedure: REVISION, ARTHROPLASTY, KNEE-stage one;  Surgeon: Dillon Peacock MD;  Location: [facility] OR;  Service: Orthopedics;  Laterality: Right;    REVISION OF KNEE ARTHROPLASTY Right 2019    Procedure: REVISION, ARTHROPLASTY, KNEE STAGE 2-OPEN LATERAL RELEASE, WITH REMOVAL OF IMPLANTS.;  Surgeon: Dillon Peacock MD;  Location: [facility] OR;  Service: Orthopedics;  Laterality: Right;    TOTAL KNEE ARTHROPLASTY Right        Social History     Socioeconomic History    Marital status:    Tobacco Use    Smoking status: Former     Packs/day: 1.00     Types: Cigarettes     Quit date: 2000     Years since quittin.0    Smokeless tobacco: Former    Tobacco comments:     quit 18 years ago    Substance and Sexual Activity    Alcohol use: No    Drug use: No    Sexual activity: Never         Medications/Allergies: See med card    Vitals:    23 1009   BP: (!) 140/67   Pulse: 67   Height: 5' 11" (1.803 m)   PainSc:   6   PainLoc: Shoulder         Physical exam:  Gen: A and O x3, pleasant, well-groomed  Skin: No rashes or obvious lesions  HEENT: PERRLA, no obvious deformities on ears or in canals. Trachea midline.  CVS: Regular rate and rhythm, normal palpable pulses.  Resp:No increased work of breathing, symmetrical chest rise.  Abdomen: Soft, " NT/ND.  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     Neuro:  Upper extremities: 5/5 strength bilaterally   Lower extremities: 5/5 strength right side  Reflexes: Brachioradialis 2+, Bicep 2+, Tricep 2+. Patellar 0+, Achilles 0+ right side  Sensory:  Intact and symmetrical to light touch and pinprick in C2-T1 dermatomes bilaterally. Right leg: Intact and symmetrical to light touch and pinprick in L2-S1 dermatomes with some decreased sensation to light touch and pinprick throughout the entire leg.    Cervical Spine:  Cervical spine: ROM is full in flexion, extension and lateral rotation without increased pain.  Spurling's maneuver causes no neck pain to either side.  Myofascial exam: No Tenderness to palpation across cervical paraspinous region bilaterally.    Lumbar spine:  Lumbar spine:  Range of motion not tested, unable to stand without assistance.  [place]'s test causes no increased pain on either side.    Supine straight leg raise is negative on the right side.  Internal and external rotation of the hip causes no increased pain on either side.  Myofascial exam: No tenderness to palpation across lumbar paraspinous muscles.    Imagin19 Xray Thoracolumbar spine:  Osseous demineralization is noted diffusely.  L1, L2, L3, and L4 posterior pedicle screw fusion is suspected with intervertebral disc prostheses at the L2-L3 and L3-L4 levels.  Intervertebral disc height loss is noted at all lumbar levels.  Vertebral body heights are suboptimally evaluated secondary to poor penetration on the lateral likely secondary to body habitus.  Atherosclerotic calcifications are noted within the aorta.       Assessment:   The patient is a 78-year-old man with a history of bladder CA status post cystectomy also requiring left leg amputation, chronic low back pain with multiple lumbar surgeries who presents in referral [facility] for continued pain.    1. Chronic pain  syndrome        2. Phantom limb pain        3. Postlaminectomy syndrome of lumbar region        4. Chronic right shoulder pain        5. Opioid use agreement exists            Plan:  1. Dr. Renae provided prescriptions for fentanyl 50 micrograms per hour and hydrocodone-acetaminophen 10/325 mg 3 times daily as needed for pain.  I have reviewed the [place] Board [employer] website and there are no abberancies.    2. I reviewed his case with Dr. Renae and I discussed spinal cord stimulation in detail with the patient.  I have given him information from [email or web address].  If we proceed with a trial I will order a psychological evaluation and back brace with lateral support.  We discussed all the risks involved.  He will contact us prior to his next visit if he would like to start the process.  3. Follow-up in 3 months or sooner as needed.

## 2023-02-14 PROBLEM — M19.011 PRIMARY OSTEOARTHRITIS OF RIGHT SHOULDER: Status: ACTIVE | Noted: 2023-02-14

## 2023-04-27 ENCOUNTER — OFFICE VISIT (OUTPATIENT)
Dept: PAIN MEDICINE | Facility: CLINIC | Age: 78
End: 2023-04-27
Payer: MEDICARE

## 2023-04-27 VITALS
WEIGHT: 284.38 LBS | BODY MASS INDEX: 39.81 KG/M2 | HEART RATE: 62 BPM | TEMPERATURE: 99 F | SYSTOLIC BLOOD PRESSURE: 159 MMHG | HEIGHT: 71 IN | DIASTOLIC BLOOD PRESSURE: 71 MMHG

## 2023-04-27 DIAGNOSIS — M54.12 CERVICAL RADICULOPATHY: ICD-10-CM

## 2023-04-27 DIAGNOSIS — G54.6 PHANTOM LIMB PAIN: ICD-10-CM

## 2023-04-27 DIAGNOSIS — G89.29 CHRONIC RIGHT SHOULDER PAIN: ICD-10-CM

## 2023-04-27 DIAGNOSIS — M25.511 CHRONIC RIGHT SHOULDER PAIN: ICD-10-CM

## 2023-04-27 DIAGNOSIS — Z79.891 OPIOID USE AGREEMENT EXISTS: ICD-10-CM

## 2023-04-27 DIAGNOSIS — M51.36 DDD (DEGENERATIVE DISC DISEASE), LUMBAR: ICD-10-CM

## 2023-04-27 DIAGNOSIS — M96.1 POSTLAMINECTOMY SYNDROME OF LUMBAR REGION: ICD-10-CM

## 2023-04-27 DIAGNOSIS — G89.4 CHRONIC PAIN SYNDROME: Primary | ICD-10-CM

## 2023-04-27 PROCEDURE — 1125F AMNT PAIN NOTED PAIN PRSNT: CPT | Mod: CPTII,S$GLB,, | Performed by: PHYSICIAN ASSISTANT

## 2023-04-27 PROCEDURE — 99999 PR PBB SHADOW E&M-EST. PATIENT-LVL IV: CPT | Mod: PBBFAC,,, | Performed by: PHYSICIAN ASSISTANT

## 2023-04-27 PROCEDURE — 1160F PR REVIEW ALL MEDS BY PRESCRIBER/CLIN PHARMACIST DOCUMENTED: ICD-10-PCS | Mod: CPTII,S$GLB,, | Performed by: PHYSICIAN ASSISTANT

## 2023-04-27 PROCEDURE — 1159F MED LIST DOCD IN RCRD: CPT | Mod: CPTII,S$GLB,, | Performed by: PHYSICIAN ASSISTANT

## 2023-04-27 PROCEDURE — 3288F PR FALLS RISK ASSESSMENT DOCUMENTED: ICD-10-PCS | Mod: CPTII,S$GLB,, | Performed by: PHYSICIAN ASSISTANT

## 2023-04-27 PROCEDURE — 99214 PR OFFICE/OUTPT VISIT, EST, LEVL IV, 30-39 MIN: ICD-10-PCS | Mod: S$GLB,,, | Performed by: PHYSICIAN ASSISTANT

## 2023-04-27 PROCEDURE — 3078F DIAST BP <80 MM HG: CPT | Mod: CPTII,S$GLB,, | Performed by: PHYSICIAN ASSISTANT

## 2023-04-27 PROCEDURE — 3077F SYST BP >= 140 MM HG: CPT | Mod: CPTII,S$GLB,, | Performed by: PHYSICIAN ASSISTANT

## 2023-04-27 PROCEDURE — 99214 OFFICE O/P EST MOD 30 MIN: CPT | Mod: S$GLB,,, | Performed by: PHYSICIAN ASSISTANT

## 2023-04-27 PROCEDURE — 1101F PT FALLS ASSESS-DOCD LE1/YR: CPT | Mod: CPTII,S$GLB,, | Performed by: PHYSICIAN ASSISTANT

## 2023-04-27 PROCEDURE — 1101F PR PT FALLS ASSESS DOC 0-1 FALLS W/OUT INJ PAST YR: ICD-10-PCS | Mod: CPTII,S$GLB,, | Performed by: PHYSICIAN ASSISTANT

## 2023-04-27 PROCEDURE — 1160F RVW MEDS BY RX/DR IN RCRD: CPT | Mod: CPTII,S$GLB,, | Performed by: PHYSICIAN ASSISTANT

## 2023-04-27 PROCEDURE — 3288F FALL RISK ASSESSMENT DOCD: CPT | Mod: CPTII,S$GLB,, | Performed by: PHYSICIAN ASSISTANT

## 2023-04-27 PROCEDURE — 3078F PR MOST RECENT DIASTOLIC BLOOD PRESSURE < 80 MM HG: ICD-10-PCS | Mod: CPTII,S$GLB,, | Performed by: PHYSICIAN ASSISTANT

## 2023-04-27 PROCEDURE — 1125F PR PAIN SEVERITY QUANTIFIED, PAIN PRESENT: ICD-10-PCS | Mod: CPTII,S$GLB,, | Performed by: PHYSICIAN ASSISTANT

## 2023-04-27 PROCEDURE — 99999 PR PBB SHADOW E&M-EST. PATIENT-LVL IV: ICD-10-PCS | Mod: PBBFAC,,, | Performed by: PHYSICIAN ASSISTANT

## 2023-04-27 PROCEDURE — 1159F PR MEDICATION LIST DOCUMENTED IN MEDICAL RECORD: ICD-10-PCS | Mod: CPTII,S$GLB,, | Performed by: PHYSICIAN ASSISTANT

## 2023-04-27 PROCEDURE — 3077F PR MOST RECENT SYSTOLIC BLOOD PRESSURE >= 140 MM HG: ICD-10-PCS | Mod: CPTII,S$GLB,, | Performed by: PHYSICIAN ASSISTANT

## 2023-04-27 RX ORDER — FENTANYL 50 UG/1
1 PATCH TRANSDERMAL
Qty: 10 PATCH | Refills: 0 | Status: SHIPPED | OUTPATIENT
Start: 2023-05-21 | End: 2023-06-20

## 2023-04-27 RX ORDER — FENTANYL 50 UG/1
1 PATCH TRANSDERMAL
Qty: 10 PATCH | Refills: 0 | Status: SHIPPED | OUTPATIENT
Start: 2023-06-20 | End: 2023-07-20

## 2023-04-27 RX ORDER — FENTANYL 50 UG/1
1 PATCH TRANSDERMAL
Qty: 10 PATCH | Refills: 0 | Status: SHIPPED | OUTPATIENT
Start: 2023-07-20 | End: 2023-08-19

## 2023-04-27 RX ORDER — HYDROCODONE BITARTRATE AND ACETAMINOPHEN 10; 325 MG/1; MG/1
1 TABLET ORAL EVERY 8 HOURS PRN
Qty: 90 TABLET | Refills: 0 | Status: SHIPPED | OUTPATIENT
Start: 2023-06-03 | End: 2023-07-03

## 2023-04-27 RX ORDER — HYDROCODONE BITARTRATE AND ACETAMINOPHEN 10; 325 MG/1; MG/1
1 TABLET ORAL EVERY 8 HOURS PRN
Qty: 90 TABLET | Refills: 0 | Status: SHIPPED | OUTPATIENT
Start: 2023-07-03 | End: 2023-08-02

## 2023-04-27 RX ORDER — HYDROCODONE BITARTRATE AND ACETAMINOPHEN 10; 325 MG/1; MG/1
1 TABLET ORAL EVERY 8 HOURS PRN
Qty: 90 TABLET | Refills: 0 | Status: SHIPPED | OUTPATIENT
Start: 2023-05-04 | End: 2023-06-03

## 2023-04-27 NOTE — TELEPHONE ENCOUNTER
Pt seen in clinic, please send Rx.      I have reviewed the [place] Board [employer] website and there are no abberancies.

## 2023-05-03 NOTE — PROGRESS NOTES
This note was completed with dictation software and grammatical errors may exist.    CC:  Back pain, neck pain, phantom limb pain    HPI:  The patient is a 78-year-old man with a history of bladder CA status post cystectomy also requiring left leg amputation, chronic low back pain with multiple lumbar surgeries who presents in referral from [facility] for continued pain.  He returns in follow-up today with worsening pain.  He complains of worsening right shoulder pain.  He has seen orthopedics since his last visit and received an injection that gave him only short-term relief.  He also complains of continued low back pain and phantom limb pain.  He would like to consider spinal cord stimulation in the future after he has further treatment for his shoulder.  He continues to take pain medicine with some relief.    Previous history:  The patient has had a total of 4 back surgeries with fusions, laminectomies, continued right leg radicular pain throughout the entire extremity and foot.  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  In the past year he had a revision of his right knee replacement but had an infection and required a spacer being placed, long-term antibiotics.  He has been recovering from this and has consulted with Neurosurgery about his back and leg pain and they do not feel that he is a surgical candidate at this point.  His pain is located across the bilateral buttocks and throughout the entire right leg.  He describes it as aching, burning, grabbing, sharp and electric.  It is worse with prolonged sitting, standing, lying down or getting out of a bed or a chair.  He does get some relief with medications. He has been taking Percocet 10/325 up to 3 times a day with about 75% relief in addition to fentanyl 50 mcg/hour.  He denies any new fevers or chills, no new weakness.    Pain intervention history:  He had tried multiple injections in the past, the last  time was over 10 years ago.  He is status post C7-T1 interlaminar epidural steroid injection on 11/17/2020 with 50% relief.      Spine surgeries: total of 4 back surgeries with fusions, laminectomies    Antineuropathics:  He has tried gabapentin in the past but had side effects.  He has not tried Topamax(has a history of renal calculi). He had tried using the Lyrica 50 mg nightly for a week and states that he has had jerking of his extremities which is the same thing that happened with gabapentin but not as severe.   NSAIDs:  Physical therapy: Outpatient PT in past, [facility]  Antidepressants:  Muscle relaxers:  Opioids:  Hydrocodone-acetaminophen 10/325 mg 3 times daily as needed, fentanyl 25 micrograms/hour  Antiplatelets/Anticoagulants:    Lab Results   Component Value Date    HGBA1C 5.5 01/03/2019         ROS:  He reports fatigability, weight gain, itching, shortness of breath, constipation, easy bruising, bleeding, history of kidney stones, joint stiffness, joint swelling, back pain, difficulty sleeping, depression and loss of balance.  Balance of review of systems is negative.    Past Medical History:   Diagnosis Date    Arthritis     Cancer 2012    left leg amputated, bladder     Chronic neck pain     Chronic pain     Encounter for blood transfusion     Hypertension     Presence of urostomy     Sleep apnea with use of continuous positive airway pressure (CPAP)     Wheelchair dependent        Past Surgical History:   Procedure Laterality Date    BACK SURGERY      4 surgeries    CARDIOVERSION      x 2    CARPAL TUNNEL RELEASE Bilateral     COLON SURGERY      COLONOSCOPY  2014    COLOSTOMY      COLOSTOMY CLOSURE      EPIDURAL STEROID INJECTION INTO CERVICAL SPINE N/A 11/17/2020    Procedure: Injection-steroid-epidural-cervical, C7-T1;  Surgeon: Felipe Renae MD;  Location: [facility];  Service: Pain Management;  Laterality: N/A;    JOINT REPLACEMENT Right 2007    TKA    JOINT REPLACEMENT Right 02/21/2018    Spacer  "exchange TKA/lateral release/Exc bony overgrowth patella    JOINT REPLACEMENT Right 2019    Stage 2 revision, all 3 components/Open lateral release.    LEG AMPUTATION AT HIP Left 2012    MEDIPORT REMOVAL Left 2018    Procedure: REMOVAL, CATHETER, CENTRAL VENOUS, TUNNELED, WITH PORT;  Surgeon: Stu Godinez MD;  Location: [facility];  Service: General;  Laterality: Left;    NECK SURGERY      2 surgeries    PORTACATH PLACEMENT      REVISION OF KNEE ARTHROPLASTY Right 2018    Procedure: REVISION, ARTHROPLASTY, KNEE-stage one;  Surgeon: Dillon Peacock MD;  Location: [facility] OR;  Service: Orthopedics;  Laterality: Right;    REVISION OF KNEE ARTHROPLASTY Right 2019    Procedure: REVISION, ARTHROPLASTY, KNEE STAGE 2-OPEN LATERAL RELEASE, WITH REMOVAL OF IMPLANTS.;  Surgeon: Dillon Peacock MD;  Location: [facility] OR;  Service: Orthopedics;  Laterality: Right;    TOTAL KNEE ARTHROPLASTY Right        Social History     Socioeconomic History    Marital status:    Tobacco Use    Smoking status: Former     Packs/day: 1.00     Types: Cigarettes     Quit date:      Years since quittin.3    Smokeless tobacco: Former    Tobacco comments:     quit 18 years ago    Substance and Sexual Activity    Alcohol use: No    Drug use: No    Sexual activity: Never         Medications/Allergies: See med card    Vitals:    23 1043 23 1050   BP: (!) 159/71    Pulse: 62    Temp: 98.6 °F (37 °C)    Weight: 129 kg (284 lb 6.3 oz)    Height: 5' 11" (1.803 m)    PainSc:   6   6   PainLoc: Shoulder          Physical exam:  Gen: A and O x3, pleasant, well-groomed  Skin: No rashes or obvious lesions  HEENT: PERRLA, no obvious deformities on ears or in canals. Trachea midline.  CVS: Regular rate and rhythm, normal palpable pulses.  Resp:No increased work of breathing, symmetrical chest rise.  Abdomen: Soft, NT/ND.  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     Neuro:  Upper " extremities: 5/5 strength bilaterally   Lower extremities: 5/5 strength right side  Reflexes: Brachioradialis 2+, Bicep 2+, Tricep 2+. Patellar 0+, Achilles 0+ right side  Sensory:  Intact and symmetrical to light touch and pinprick in C2-T1 dermatomes bilaterally. Right leg: Intact and symmetrical to light touch and pinprick in L2-S1 dermatomes with some decreased sensation to light touch and pinprick throughout the entire leg.    Cervical Spine:  Cervical spine: ROM is full in flexion, extension and lateral rotation without increased pain.  Spurling's maneuver causes no neck pain to either side.  Myofascial exam: No Tenderness to palpation across cervical paraspinous region bilaterally.    Lumbar spine:  Lumbar spine:  Range of motion not tested, unable to stand without assistance.  [place]'s test causes no increased pain on either side.    Supine straight leg raise is negative on the right side.  Internal and external rotation of the hip causes no increased pain on either side.  Myofascial exam: No tenderness to palpation across lumbar paraspinous muscles.    Imagin19 Xray Thoracolumbar spine:  Osseous demineralization is noted diffusely.  L1, L2, L3, and L4 posterior pedicle screw fusion is suspected with intervertebral disc prostheses at the L2-L3 and L3-L4 levels.  Intervertebral disc height loss is noted at all lumbar levels.  Vertebral body heights are suboptimally evaluated secondary to poor penetration on the lateral likely secondary to body habitus.  Atherosclerotic calcifications are noted within the aorta.       Assessment:   The patient is a 78-year-old man with a history of bladder CA status post cystectomy also requiring left leg amputation, chronic low back pain with multiple lumbar surgeries who presents in referral from [facility] for continued pain.    1. Chronic pain syndrome        2. Phantom limb pain        3. Postlaminectomy syndrome of lumbar region        4.  Chronic right shoulder pain        5. Cervical radiculopathy        6. Opioid use agreement exists            Plan:  1. Dr. Renae provided prescriptions for fentanyl 50 micrograms per hour and hydrocodone-acetaminophen 10/325 mg 3 times daily as needed for pain.  I have reviewed the [place] Board of Pharmacy website and there are no abberancies.    2. He is going to contact orthopedics to request a shoulder injection through [facility] which was suggested at his last visit.  3. We again discussed spinal cord stimulation.  He would like to do this in the future and has received information from [facility] in the past.    4. Follow-up in 3 months or sooner as needed.

## 2023-06-01 ENCOUNTER — NURSE TRIAGE (OUTPATIENT)
Dept: ADMINISTRATIVE | Facility: CLINIC | Age: 78
End: 2023-06-01
Payer: MEDICARE
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
## 2023-06-06 NOTE — TELEPHONE ENCOUNTER
Attempted to call pt, left message on voicemail to return call to clinic.  Pt scheduled for appt with Sanjay on 6/9.

## 2023-08-08 ENCOUNTER — TELEPHONE (OUTPATIENT)
Dept: PAIN MEDICINE | Facility: CLINIC | Age: 78
End: 2023-08-08
Payer: MEDICARE

## 2023-08-08 NOTE — TELEPHONE ENCOUNTER
----- Message from Tyra Cabrera sent at 8/8/2023  8:46 AM CDT -----  Type:  Needs Medical Advice    Who Called:  Pt    Would the patient rather a call back or a response via [facility]?  Call back    Best Call Back Number:  [phone]    Additional Information:  Pt is calling to have someone contact him back about when he is suppose to have an appt again with either Dr. Renae or [facility].  Please call back to advise. Thanks!

## 2023-08-10 ENCOUNTER — TELEPHONE (OUTPATIENT)
Dept: GASTROENTEROLOGY | Facility: CLINIC | Age: 78
End: 2023-08-10
Payer: MEDICARE

## 2023-08-10 NOTE — TELEPHONE ENCOUNTER
----- Message from Heber May sent at 8/10/2023  3:13 PM CDT -----  Contact: Jennifer from [facility] at [phone]  Type:  Sooner Appointment Request    Caller is requesting a sooner appointment.  Caller declined first available appointment listed below.  Caller will not accept being placed on the waitlist and is requesting a message be sent to doctor.    Name of Caller:  Jennifer  When is the first available appointment?  N/A  Symptoms:  pain in stomach  Best Call Back Number:  [phone]  Additional Information:  Jennifer for [facility] is calling the office eo schedule nan appt but nothing comes up.

## 2023-10-12 ENCOUNTER — TELEPHONE (OUTPATIENT)
Dept: PAIN MEDICINE | Facility: CLINIC | Age: 78
End: 2023-10-12
Payer: MEDICARE

## 2023-10-12 NOTE — TELEPHONE ENCOUNTER
----- Message from Pebbles Mirza sent at 10/9/2023 11:59 AM CDT -----  Regarding: Pt Advice  Needs Medical Advice      Who Called: Pt         Pharmacy name and phone #:    [facility] - [place], [place] - [address]  [address] [facility] [address]  Phone: [phone] Fax: [phone]        Would the patient rather a call back or a response via [facility]?  Call back    Best Call Back Number:  [phone]      Additional Information: Sts in nursing home in [place]. Went to be seen about shoulder and was told he should go back to [facility] pain medicine. Says he needed to go back through Dr Renae to get the okay.   Please Advise - Thank you

## 2023-10-27 ENCOUNTER — TELEPHONE (OUTPATIENT)
Dept: PAIN MEDICINE | Facility: CLINIC | Age: 78
End: 2023-10-27

## 2023-10-27 NOTE — TELEPHONE ENCOUNTER
----- Message from Dania Adams sent at 10/27/2023  2:37 PM CDT -----  Type :  Needs Medical Advice    Who Called:  pt   Symptoms (please be specific):  meds refill   How long has patient had these symptoms:   med refill  Pharmacy name and phone #:   no  Would the patient rather a call back or a response via [facility]?  Call  Best Call Back Number:  [phone]  Additional Information:   appt he have  is in [place]

## 2023-11-13 ENCOUNTER — OFFICE VISIT (OUTPATIENT)
Dept: PAIN MEDICINE | Facility: CLINIC | Age: 78
End: 2023-11-13
Payer: MEDICARE

## 2023-11-13 VITALS
HEART RATE: 70 BPM | RESPIRATION RATE: 18 BRPM | SYSTOLIC BLOOD PRESSURE: 104 MMHG | DIASTOLIC BLOOD PRESSURE: 53 MMHG | HEIGHT: 71 IN | BODY MASS INDEX: 39.76 KG/M2 | WEIGHT: 284 LBS

## 2023-11-13 DIAGNOSIS — Z79.891 OPIOID USE AGREEMENT EXISTS: ICD-10-CM

## 2023-11-13 DIAGNOSIS — M96.1 POSTLAMINECTOMY SYNDROME OF LUMBAR REGION: ICD-10-CM

## 2023-11-13 DIAGNOSIS — G89.29 CHRONIC RIGHT SHOULDER PAIN: ICD-10-CM

## 2023-11-13 DIAGNOSIS — G89.4 CHRONIC PAIN SYNDROME: Primary | ICD-10-CM

## 2023-11-13 DIAGNOSIS — G54.6 PHANTOM LIMB PAIN: ICD-10-CM

## 2023-11-13 DIAGNOSIS — M25.511 CHRONIC RIGHT SHOULDER PAIN: ICD-10-CM

## 2023-11-13 DIAGNOSIS — M54.12 CERVICAL RADICULOPATHY: ICD-10-CM

## 2023-11-13 PROCEDURE — 1159F MED LIST DOCD IN RCRD: CPT | Mod: CPTII,S$GLB,, | Performed by: PHYSICIAN ASSISTANT

## 2023-11-13 PROCEDURE — 99214 PR OFFICE/OUTPT VISIT, EST, LEVL IV, 30-39 MIN: ICD-10-PCS | Mod: S$GLB,,, | Performed by: PHYSICIAN ASSISTANT

## 2023-11-13 PROCEDURE — 3074F PR MOST RECENT SYSTOLIC BLOOD PRESSURE < 130 MM HG: ICD-10-PCS | Mod: CPTII,S$GLB,, | Performed by: PHYSICIAN ASSISTANT

## 2023-11-13 PROCEDURE — 99214 OFFICE O/P EST MOD 30 MIN: CPT | Mod: S$GLB,,, | Performed by: PHYSICIAN ASSISTANT

## 2023-11-13 PROCEDURE — 1101F PR PT FALLS ASSESS DOC 0-1 FALLS W/OUT INJ PAST YR: ICD-10-PCS | Mod: CPTII,S$GLB,, | Performed by: PHYSICIAN ASSISTANT

## 2023-11-13 PROCEDURE — 99999 PR PBB SHADOW E&M-EST. PATIENT-LVL IV: ICD-10-PCS | Mod: PBBFAC,,, | Performed by: PHYSICIAN ASSISTANT

## 2023-11-13 PROCEDURE — 3288F FALL RISK ASSESSMENT DOCD: CPT | Mod: CPTII,S$GLB,, | Performed by: PHYSICIAN ASSISTANT

## 2023-11-13 PROCEDURE — 1125F PR PAIN SEVERITY QUANTIFIED, PAIN PRESENT: ICD-10-PCS | Mod: CPTII,S$GLB,, | Performed by: PHYSICIAN ASSISTANT

## 2023-11-13 PROCEDURE — 3078F PR MOST RECENT DIASTOLIC BLOOD PRESSURE < 80 MM HG: ICD-10-PCS | Mod: CPTII,S$GLB,, | Performed by: PHYSICIAN ASSISTANT

## 2023-11-13 PROCEDURE — 1160F PR REVIEW ALL MEDS BY PRESCRIBER/CLIN PHARMACIST DOCUMENTED: ICD-10-PCS | Mod: CPTII,S$GLB,, | Performed by: PHYSICIAN ASSISTANT

## 2023-11-13 PROCEDURE — 3288F PR FALLS RISK ASSESSMENT DOCUMENTED: ICD-10-PCS | Mod: CPTII,S$GLB,, | Performed by: PHYSICIAN ASSISTANT

## 2023-11-13 PROCEDURE — 3074F SYST BP LT 130 MM HG: CPT | Mod: CPTII,S$GLB,, | Performed by: PHYSICIAN ASSISTANT

## 2023-11-13 PROCEDURE — 99999 PR PBB SHADOW E&M-EST. PATIENT-LVL IV: CPT | Mod: PBBFAC,,, | Performed by: PHYSICIAN ASSISTANT

## 2023-11-13 PROCEDURE — 3078F DIAST BP <80 MM HG: CPT | Mod: CPTII,S$GLB,, | Performed by: PHYSICIAN ASSISTANT

## 2023-11-13 PROCEDURE — 1160F RVW MEDS BY RX/DR IN RCRD: CPT | Mod: CPTII,S$GLB,, | Performed by: PHYSICIAN ASSISTANT

## 2023-11-13 PROCEDURE — 1159F PR MEDICATION LIST DOCUMENTED IN MEDICAL RECORD: ICD-10-PCS | Mod: CPTII,S$GLB,, | Performed by: PHYSICIAN ASSISTANT

## 2023-11-13 PROCEDURE — 1101F PT FALLS ASSESS-DOCD LE1/YR: CPT | Mod: CPTII,S$GLB,, | Performed by: PHYSICIAN ASSISTANT

## 2023-11-13 PROCEDURE — 1125F AMNT PAIN NOTED PAIN PRSNT: CPT | Mod: CPTII,S$GLB,, | Performed by: PHYSICIAN ASSISTANT

## 2023-11-15 ENCOUNTER — TELEPHONE (OUTPATIENT)
Dept: PAIN MEDICINE | Facility: CLINIC | Age: 78
End: 2023-11-15
Payer: MEDICARE

## 2023-11-15 NOTE — TELEPHONE ENCOUNTER
Please contact the patient and find out what pharmacy we need to send his medication to.  During our visit earlier in the week he was going to contact us with a location because he is staying in a facility.  I spoke with Dr. Renae about his medication.  He suggested to continue to take the oxycodone that was prescribed by the physician at the facility and Dr. Renae will send a fentanyl patch to use in addition to this.

## 2023-11-16 NOTE — TELEPHONE ENCOUNTER
The facility uses a RX provider they stated they need a hard script faxed to [facility]   # [phone]. They will forward it to the appropriate pharmacy. Patient called back they use [facility] in [facility]  for all their medications. Their fax # [phone]

## 2023-11-20 NOTE — TELEPHONE ENCOUNTER
""[facility]" is not found in the pharmacy search.  Can we confirm this so we can send the prescription electronically?  Maybe speak to someone at his facility, not the patient to confirm the pharmacy.  "
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
## 2023-11-27 RX ORDER — FENTANYL 25 UG/1
1 PATCH TRANSDERMAL
Qty: 10 PATCH | Refills: 0 | Status: SHIPPED | OUTPATIENT
Start: 2023-11-27 | End: 2024-02-01 | Stop reason: SDUPTHER

## 2024-01-02 ENCOUNTER — TELEPHONE (OUTPATIENT)
Dept: PAIN MEDICINE | Facility: CLINIC | Age: 79
End: 2024-01-02
Payer: MEDICARE

## 2024-01-02 DIAGNOSIS — M54.12 CERVICAL RADICULOPATHY: Primary | ICD-10-CM

## 2024-01-02 RX ORDER — SODIUM CHLORIDE, SODIUM LACTATE, POTASSIUM CHLORIDE, CALCIUM CHLORIDE 600; 310; 30; 20 MG/100ML; MG/100ML; MG/100ML; MG/100ML
INJECTION, SOLUTION INTRAVENOUS CONTINUOUS
OUTPATIENT
Start: 2024-01-02

## 2024-01-02 NOTE — TELEPHONE ENCOUNTER
12/12/2023 right upper extremity EMG results noted, chronic right C5, C6 cervical radiculopathy, severe right carpal tunnel syndrome at the wrist.  Patient has consult with Dr. Castle at the end of the month.  I believe he is likely symptomatic from both.  Please contact the patient and offer a C7-T1 interlaminar epidural steroid injection while waiting for the orthopedic appointment for his carpal tunnel syndrome.    Physician - Dr Renae    Type of Procedure/Injection - Cervical Epidural  C7/T1           Laterality - NA      Anxiolysis- RNIV      Need to hold medication - Yes      Aspirin for 7 days, Eliquis for 3 days, and NSAIDs for 2 days      Clearance needed - Yes      Follow up - 4 week

## 2024-01-02 NOTE — TELEPHONE ENCOUNTER
----- Message from Kalie Dorman LPN sent at 12/19/2023  2:02 PM CST -----  Regarding: EMG/NCS  Good afternoon Marin,   I just wanted to let you guys know we had Mr. Wyatt undergo EMG/NCS.  It is scanned into the media tab if you wanted to review it.    Thanks!  Kalie

## 2024-01-04 NOTE — TELEPHONE ENCOUNTER
Called and spoke with patient regarding recent EMG and discussed providers review and recommendation. Patient agreeable to plan. Patient scheduled for cervical ESDRAS on 1/224 with Dr. Renae. [facility] [place]. Spoke with patient's nurse Suzie. Advised that per provider patient to hold ASAx7, Eliquis x 3 and NSAIDS x2 days prior. Orders and clearance letter to be faxed to [facility] by the office. Dr. Manny Chatterjee is facility medical director and will address clearance. Follow up appointment scheduled.

## 2024-01-24 ENCOUNTER — TELEPHONE (OUTPATIENT)
Dept: PAIN MEDICINE | Facility: CLINIC | Age: 79
End: 2024-01-24
Payer: MEDICARE

## 2024-01-24 NOTE — TELEPHONE ENCOUNTER
----- Message from Ira Browne sent at 1/23/2024  9:32 AM CST -----  Regarding: Procedure  Contact: [phone]  Type:  Needs Medical Advice    Who Called: Edmundo  Would the patient rather a call back or a response via [facility]? Call  Best Call Back Number: [phone]  Additional Information: Patient would like to know when will his procedure be scheduled.

## 2024-01-26 ENCOUNTER — OFFICE VISIT (OUTPATIENT)
Dept: ORTHOPEDICS | Facility: CLINIC | Age: 79
End: 2024-01-26
Payer: MEDICARE

## 2024-01-26 VITALS — HEIGHT: 71 IN | WEIGHT: 284 LBS | BODY MASS INDEX: 39.76 KG/M2

## 2024-01-26 DIAGNOSIS — G56.01 CARPAL TUNNEL SYNDROME OF RIGHT WRIST: ICD-10-CM

## 2024-01-26 PROCEDURE — 1101F PT FALLS ASSESS-DOCD LE1/YR: CPT | Mod: CPTII,S$GLB,, | Performed by: ORTHOPAEDIC SURGERY

## 2024-01-26 PROCEDURE — 1159F MED LIST DOCD IN RCRD: CPT | Mod: CPTII,S$GLB,, | Performed by: ORTHOPAEDIC SURGERY

## 2024-01-26 PROCEDURE — 3288F FALL RISK ASSESSMENT DOCD: CPT | Mod: CPTII,S$GLB,, | Performed by: ORTHOPAEDIC SURGERY

## 2024-01-26 PROCEDURE — 99999 PR PBB SHADOW E&M-EST. PATIENT-LVL III: CPT | Mod: PBBFAC,,, | Performed by: ORTHOPAEDIC SURGERY

## 2024-01-26 PROCEDURE — 1125F AMNT PAIN NOTED PAIN PRSNT: CPT | Mod: CPTII,S$GLB,, | Performed by: ORTHOPAEDIC SURGERY

## 2024-01-26 PROCEDURE — 99203 OFFICE O/P NEW LOW 30 MIN: CPT | Mod: S$GLB,,, | Performed by: ORTHOPAEDIC SURGERY

## 2024-01-26 NOTE — PROGRESS NOTES
1/26/2024    Chief Complaint:  Chief Complaint   Patient presents with    Right Hand - Pain, Numbness    Right Wrist - Numbness, Pain       HPI:  Edmundo Beyer is a 79 y.o. male, who presents to clinic today has a history of numbness tingling to his right hand.  He also has pain and dysfunction of his right shoulder.  He has been treated by Dr. Sarah Cisse for the shoulder.  He is here today for assessment of his right hand.  He has had a nerve conduction performed by Dr. Cagle.    PMHX:  Past Medical History:   Diagnosis Date    Arthritis     Cancer 2012    left leg amputated, bladder     Chronic neck pain     Chronic pain     Encounter for blood transfusion     Hypertension     Presence of urostomy     Sleep apnea with use of continuous positive airway pressure (CPAP)     Wheelchair dependent        PSHX:  Past Surgical History:   Procedure Laterality Date    BACK SURGERY      4 surgeries    CARDIOVERSION      x 2    CARPAL TUNNEL RELEASE Bilateral     COLON SURGERY      COLONOSCOPY  2014    COLOSTOMY      COLOSTOMY CLOSURE      EPIDURAL STEROID INJECTION INTO CERVICAL SPINE N/A 11/17/2020    Procedure: Injection-steroid-epidural-cervical, C7-T1;  Surgeon: Felipe Renae MD;  Location: [facility] OR;  Service: Pain Management;  Laterality: N/A;    JOINT REPLACEMENT Right 2007    TKA    JOINT REPLACEMENT Right 02/21/2018    Spacer exchange TKA/lateral release/Exc bony overgrowth patella    JOINT REPLACEMENT Right 01/05/2019    Stage 2 revision, all 3 components/Open lateral release.    LEG AMPUTATION AT HIP Left 09/20/2012    MEDIPORT REMOVAL Left 8/30/2018    Procedure: REMOVAL, CATHETER, CENTRAL VENOUS, TUNNELED, WITH PORT;  Surgeon: Stu Godinez MD;  Location: [facility] OR;  Service: General;  Laterality: Left;    NECK SURGERY      2 surgeries    PORTACATH PLACEMENT      REVISION OF KNEE ARTHROPLASTY Right 6/26/2018    Procedure: REVISION, ARTHROPLASTY, KNEE-stage one;  Surgeon: Dillon Peacock MD;  Location:  [facility] OR;  Service: Orthopedics;  Laterality: Right;    REVISION OF KNEE ARTHROPLASTY Right 2019    Procedure: REVISION, ARTHROPLASTY, KNEE STAGE 2-OPEN LATERAL RELEASE, WITH REMOVAL OF IMPLANTS.;  Surgeon: Dillon Peacock MD;  Location: [facility] OR;  Service: Orthopedics;  Laterality: Right;    TOTAL KNEE ARTHROPLASTY Right        FMHX:  Family History   Adopted: Yes       SOCHX:  Social History     Tobacco Use    Smoking status: Former     Current packs/day: 0.00     Types: Cigarettes     Quit date:      Years since quittin.0    Smokeless tobacco: Former    Tobacco comments:     quit 18 years ago    Substance Use Topics    Alcohol use: No       ALLERGIES:  Hydromorphone and Morphine    CURRENT MEDICATIONS:  Current Outpatient Medications on File Prior to Visit   Medication Sig Dispense Refill    acetaminophen (TYLENOL) 325 MG tablet Take 975 mg by mouth every 6 (six) hours as needed.      allopurinol (ZYLOPRIM) 100 MG tablet       amlodipine (NORVASC) 10 MG tablet Take 10 mg by mouth once daily. Take am of surgery with a sip of water      amlodipine-atorvastatin (CADUET) 10-10 mg per tablet Take 1 tablet by mouth.      apixaban (ELIQUIS) 2.5 mg Tab Take 2.5 mg by mouth.      aspirin (ECOTRIN) 81 MG EC tablet Take 81 mg by mouth.      ergocalciferol (ERGOCALCIFEROL) 50,000 unit Cap Vitamin D2 1,250 mcg (50,000 unit) capsule      escitalopram oxalate (LEXAPRO) 10 MG tablet Take 10 mg by mouth once daily. Take am of surgery with a sip of water      ferrous gluconate (FERGON) 324 MG tablet Take 1 tablet (324 mg total) by mouth daily with breakfast. (Patient taking differently: Take 324 mg by mouth daily with breakfast. Hold am of surgery)      furosemide (LASIX) 40 MG tablet       hydroCHLOROthiazide (HYDRODIURIL) 12.5 MG Tab GIVE 1 TAB BY MOUTH DAILY      magnesium oxide (MAG-OX) 400 mg (241.3 mg magnesium) tablet Take 1 tablet by mouth.      meloxicam (MOBIC) 15 MG tablet Take 15 mg by mouth.      metoprolol  "tartrate (LOPRESSOR) 50 MG tablet Take 50 mg by mouth 2 (two) times daily. Take am of surgery with a sip of water      naloxone (NARCAN) 4 mg/actuation Spry SMARTSIG:Both Nares      pantoprazole (PROTONIX) 40 MG tablet Take 40 mg by mouth once daily. Take am of surgery with a sip of water      potassium citrate (UROCIT-K) 10 mEq (1,080 mg) TbSR TAKE 1 TABLET THREE TIMES DAILY WITH MEALS , DO NOT CRUSH, CHEW, OR SPLIT.      rosuvastatin (CRESTOR) 10 MG tablet       tamsulosin (FLOMAX) 0.4 mg Cap       zolpidem (AMBIEN) 5 MG Tab zolpidem 5 mg tablet       No current facility-administered medications on file prior to visit.       REVIEW OF SYSTEMS:  Review of Systems   Constitutional: Negative.    HENT: Negative.     Eyes: Negative.    Respiratory: Negative.     Cardiovascular: Negative.    Gastrointestinal: Negative.    Genitourinary: Negative.    Musculoskeletal:  Positive for joint pain.   Skin: Negative.    Neurological:  Positive for tingling and weakness.   Endo/Heme/Allergies: Negative.    Psychiatric/Behavioral: Negative.       GENERAL PHYSICAL EXAM:   Ht 5' 11" (1.803 m)   Wt 128.8 kg (284 lb)   BMI 39.61 kg/m²    GEN: well developed, well nourished, no acute distress   HENT: Normocephalic, atraumatic   EYES: No discharge, conjunctiva normal   NECK: Supple, non-tender   PULM: No wheezing, no respiratory distress   CV: RRR   ABD: Soft, non-tender    ORTHO EXAM:   Examination of the right hand and wrist reveals that he does have edema about the hand.  There are changes consistent with aging.  Palpation does not produce areas of tenderness.  He does report intact sensation in the median radial ulnar distribution.  Has negative Tinel's and a negative Durkan's test.  Has a 2+ radial pulse.  He does have 4/5 thenar and intrinsic muscular weakness.    RADIOLOGY:    None    ASSESSMENT:   Right carpal tunnel syndrome versus peripheral neuropathy versus cervical radiculopathy    PLAN:  1. Will attempt to obtain the " patient's nerve conduction study to further assess and discuss treatment     2. He will follow up with me pradha.

## 2024-01-30 DIAGNOSIS — G89.4 CHRONIC PAIN SYNDROME: Primary | ICD-10-CM

## 2024-01-30 NOTE — TELEPHONE ENCOUNTER
----- Message from Santa Mendez sent at 1/30/2024  1:26 PM CST -----  Regarding: Meds  Type:  Needs Medical Advice    Who Called:  carla /Lizette Bennett      Would the patient rather a call back or a response via [facility]? Call back    Best Call Back Number: [phone]     Additional Information: Pt needs to get fentanyl 25MG until his upcoming appt. Thank you

## 2024-01-31 NOTE — TELEPHONE ENCOUNTER
Please contact [facility] and find out if Dr. Chatterjee is prescribing this going forward.  Both prescriptions for fentanyl and oxycodone came from him in December.

## 2024-02-01 RX ORDER — FENTANYL 25 UG/1
1 PATCH TRANSDERMAL
Qty: 10 PATCH | Refills: 0 | Status: SHIPPED | OUTPATIENT
Start: 2024-02-01 | End: 2024-02-26 | Stop reason: SDUPTHER

## 2024-02-01 RX ORDER — OXYCODONE HYDROCHLORIDE 5 MG/1
5 TABLET ORAL EVERY 8 HOURS PRN
Qty: 90 TABLET | Refills: 0 | Status: SHIPPED | OUTPATIENT
Start: 2024-02-01 | End: 2024-02-26 | Stop reason: SDUPTHER

## 2024-02-01 NOTE — ADDENDUM NOTE
Addended by: CELESTE YUEN on: 2/1/2024 01:54 PM     Modules accepted: Orders     Doing well, swelling went down, hit head on edge of coffee table, just has a bruise now, had pretty big bump on it, happened at 7:30 woke her up at 9:30 swelling was down and she was normal  Eating, playing normally  No loss of consciousness, fall was witnessed and she is back to normal self now, mom will monitor and call back if needed

## 2024-02-01 NOTE — TELEPHONE ENCOUNTER
Spoke with the patient nurse at [facility] she states that the Dr there only filled it one time only due to patient being completely out of the medication. Dr. Chatterjee says that Pain Management has to continue writing it for the patient cause the patient is under our care. Can you please send the prescriptions to [facility] [phone]

## 2024-02-01 NOTE — TELEPHONE ENCOUNTER
Spoke with staff, facility MD only prescribed for 1 month because he was out and would like us to continue to provide his pain medication, was given oxycodone 5 mg and fentanyl 25 micro grams per hour. Please send Rx. I have reviewed the [place] Board of Pharmacy website and there are no abberancies.

## 2024-02-26 ENCOUNTER — OFFICE VISIT (OUTPATIENT)
Dept: PAIN MEDICINE | Facility: CLINIC | Age: 79
End: 2024-02-26
Payer: MEDICARE

## 2024-02-26 VITALS
BODY MASS INDEX: 39.6 KG/M2 | WEIGHT: 282.88 LBS | HEART RATE: 70 BPM | SYSTOLIC BLOOD PRESSURE: 110 MMHG | DIASTOLIC BLOOD PRESSURE: 70 MMHG | HEIGHT: 71 IN

## 2024-02-26 DIAGNOSIS — Z79.891 OPIOID USE AGREEMENT EXISTS: ICD-10-CM

## 2024-02-26 DIAGNOSIS — G89.4 CHRONIC PAIN SYNDROME: ICD-10-CM

## 2024-02-26 DIAGNOSIS — G89.29 CHRONIC RIGHT SHOULDER PAIN: ICD-10-CM

## 2024-02-26 DIAGNOSIS — M54.12 CERVICAL RADICULOPATHY: Primary | ICD-10-CM

## 2024-02-26 DIAGNOSIS — M25.511 CHRONIC RIGHT SHOULDER PAIN: ICD-10-CM

## 2024-02-26 PROCEDURE — 1101F PT FALLS ASSESS-DOCD LE1/YR: CPT | Mod: CPTII,S$GLB,, | Performed by: ANESTHESIOLOGY

## 2024-02-26 PROCEDURE — 3078F DIAST BP <80 MM HG: CPT | Mod: CPTII,S$GLB,, | Performed by: ANESTHESIOLOGY

## 2024-02-26 PROCEDURE — 3288F FALL RISK ASSESSMENT DOCD: CPT | Mod: CPTII,S$GLB,, | Performed by: ANESTHESIOLOGY

## 2024-02-26 PROCEDURE — 1125F AMNT PAIN NOTED PAIN PRSNT: CPT | Mod: CPTII,S$GLB,, | Performed by: ANESTHESIOLOGY

## 2024-02-26 PROCEDURE — 3074F SYST BP LT 130 MM HG: CPT | Mod: CPTII,S$GLB,, | Performed by: ANESTHESIOLOGY

## 2024-02-26 PROCEDURE — 99999 PR PBB SHADOW E&M-EST. PATIENT-LVL III: CPT | Mod: PBBFAC,,, | Performed by: ANESTHESIOLOGY

## 2024-02-26 PROCEDURE — 99214 OFFICE O/P EST MOD 30 MIN: CPT | Mod: S$GLB,,, | Performed by: ANESTHESIOLOGY

## 2024-02-26 PROCEDURE — 1159F MED LIST DOCD IN RCRD: CPT | Mod: CPTII,S$GLB,, | Performed by: ANESTHESIOLOGY

## 2024-02-26 RX ORDER — FENTANYL 25 UG/1
1 PATCH TRANSDERMAL
Qty: 10 PATCH | Refills: 0 | Status: SHIPPED | OUTPATIENT
Start: 2024-02-26 | End: 2024-03-27

## 2024-02-26 RX ORDER — OXYCODONE HYDROCHLORIDE 5 MG/1
5 TABLET ORAL EVERY 8 HOURS PRN
Qty: 90 TABLET | Refills: 0 | Status: SHIPPED | OUTPATIENT
Start: 2024-03-27 | End: 2024-04-26

## 2024-02-26 RX ORDER — FENTANYL 25 UG/1
1 PATCH TRANSDERMAL
Qty: 10 PATCH | Refills: 0 | Status: SHIPPED | OUTPATIENT
Start: 2024-03-27 | End: 2024-04-26

## 2024-02-26 RX ORDER — FENTANYL 25 UG/1
1 PATCH TRANSDERMAL
Qty: 10 PATCH | Refills: 0 | Status: SHIPPED | OUTPATIENT
Start: 2024-04-26 | End: 2024-05-23 | Stop reason: SDUPTHER

## 2024-02-26 RX ORDER — OXYCODONE HYDROCHLORIDE 5 MG/1
5 TABLET ORAL EVERY 8 HOURS PRN
Qty: 90 TABLET | Refills: 0 | Status: SHIPPED | OUTPATIENT
Start: 2024-02-26 | End: 2024-03-27

## 2024-02-26 RX ORDER — OXYCODONE HYDROCHLORIDE 5 MG/1
5 TABLET ORAL EVERY 8 HOURS PRN
Qty: 90 TABLET | Refills: 0 | Status: SHIPPED | OUTPATIENT
Start: 2024-04-26 | End: 2024-05-23 | Stop reason: SDUPTHER

## 2024-02-26 NOTE — PROGRESS NOTES
This note was completed with dictation software and grammatical errors may exist.    CC:  Back pain, right shoulder pain, neck pain    HPI:  The patient is a 78-year-old man with a history of bladder CA status post cystectomy also requiring left leg amputation, chronic low back pain with multiple lumbar surgeries who presents in referral from [facility] for continued pain.        Previous history:  The patient has had a total of 4 back surgeries with fusions, laminectomies, continued right leg radicular pain throughout the entire extremity and foot.  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  In the past year he had a revision of his right knee replacement but had an infection and required a spacer being placed, long-term antibiotics.  He has been recovering from this and has consulted with Neurosurgery about his back and leg pain and they do not feel that he is a surgical candidate at this point.  His pain is located across the bilateral buttocks and throughout the entire right leg.  He describes it as aching, burning, grabbing, sharp and electric.  It is worse with prolonged sitting, standing, lying down or getting out of a bed or a chair.  He does get some relief with medications. He has been taking Percocet 10/325 up to 3 times a day with about 75% relief in addition to fentanyl 50 mcg/hour.  He denies any new fevers or chills, no new weakness.    Pain intervention history:  He had tried multiple injections in the past, the last time was over 10 years ago.  He is status post C7-T1 interlaminar epidural steroid injection on 11/17/2020 with 50% relief.      Spine surgeries: total of 4 back surgeries with fusions, laminectomies    Antineuropathics:  He has tried gabapentin in the past but had side effects.  He has not tried Topamax(has a history of renal calculi). He had tried using the Lyrica 50 mg nightly for a week and states that he has had jerking of his  extremities which is the same thing that happened with gabapentin but not as severe.   NSAIDs:  Physical therapy: Outpatient PT in past, [facility]  Antidepressants:  Muscle relaxers:  Opioids:  Hydrocodone-acetaminophen 10/325 mg 3 times daily as needed, fentanyl 25 micrograms/hour  Antiplatelets/Anticoagulants:    Lab Results   Component Value Date    HGBA1C 5.5 01/03/2019         ROS:  He reports fatigability, weight gain, itching, shortness of breath, constipation, easy bruising, bleeding, history of kidney stones, joint stiffness, joint swelling, back pain, difficulty sleeping, depression and loss of balance.  Balance of review of systems is negative.    Past Medical History:   Diagnosis Date    Arthritis     Cancer 2012    left leg amputated, bladder     Chronic neck pain     Chronic pain     Encounter for blood transfusion     Hypertension     Presence of urostomy     Sleep apnea with use of continuous positive airway pressure (CPAP)     Wheelchair dependent        Past Surgical History:   Procedure Laterality Date    BACK SURGERY      4 surgeries    CARDIOVERSION      x 2    CARPAL TUNNEL RELEASE Bilateral     COLON SURGERY      COLONOSCOPY  2014    COLOSTOMY      COLOSTOMY CLOSURE      EPIDURAL STEROID INJECTION INTO CERVICAL SPINE N/A 11/17/2020    Procedure: Injection-steroid-epidural-cervical, C7-T1;  Surgeon: Felipe Renae MD;  Location: [facility] OR;  Service: Pain Management;  Laterality: N/A;    JOINT REPLACEMENT Right 2007    TKA    JOINT REPLACEMENT Right 02/21/2018    Spacer exchange TKA/lateral release/Exc bony overgrowth patella    JOINT REPLACEMENT Right 01/05/2019    Stage 2 revision, all 3 components/Open lateral release.    LEG AMPUTATION AT HIP Left 09/20/2012    MEDIPORT REMOVAL Left 8/30/2018    Procedure: REMOVAL, CATHETER, CENTRAL VENOUS, TUNNELED, WITH PORT;  Surgeon: Stu Godinez MD;  Location: [facility] OR;  Service: General;  Laterality: Left;    NECK SURGERY      2 surgeries    PORTACATH  "PLACEMENT      REVISION OF KNEE ARTHROPLASTY Right 2018    Procedure: REVISION, ARTHROPLASTY, KNEE-stage one;  Surgeon: Dillon Peacock MD;  Location: [facility] OR;  Service: Orthopedics;  Laterality: Right;    REVISION OF KNEE ARTHROPLASTY Right 2019    Procedure: REVISION, ARTHROPLASTY, KNEE STAGE 2-OPEN LATERAL RELEASE, WITH REMOVAL OF IMPLANTS.;  Surgeon: Dillon Peacock MD;  Location: [facility] OR;  Service: Orthopedics;  Laterality: Right;    TOTAL KNEE ARTHROPLASTY Right        Social History     Socioeconomic History    Marital status: Single   Tobacco Use    Smoking status: Former     Current packs/day: 0.00     Types: Cigarettes     Quit date:      Years since quittin.1    Smokeless tobacco: Former    Tobacco comments:     quit 18 years ago    Substance and Sexual Activity    Alcohol use: No    Drug use: No    Sexual activity: Never         Medications/Allergies: See med card    Vitals:    24 0834   BP: 110/70   Pulse: 70   Weight: 128.3 kg (282 lb 13.6 oz)   Height: 5' 11" (1.803 m)   PainSc:   7   PainLoc: Back         2024     8:35 AM 2023     1:15 PM 5/3/2022     8:33 AM   Last 3 PDI Scores   Pain Disability Index (PDI) 28 35 35         Physical exam:  Gen: A and O x3, pleasant, well-groomed  Skin: No rashes or obvious lesions  HEENT: PERRLA, no obvious deformities on ears or in canals. Trachea midline.  CVS: Regular rate and rhythm, normal palpable pulses.  Resp:No increased work of breathing, symmetrical chest rise.  Abdomen: Soft, NT/ND.  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     Neuro:  Upper extremities: 5/5 strength left side, 3/5 right  Lower extremities: 5/5 strength right side  Reflexes: Brachioradialis 0+, Bicep 0+, Tricep 0+. Patellar 0+, Achilles 0+ right side  Sensory:  Intact and symmetrical to light touch and pinprick in C2-T1 dermatomes bilaterally. Right leg: Intact and symmetrical to light touch and pinprick in L2-S1 " dermatomes with some decreased sensation to light touch and pinprick throughout the entire leg.    Cervical Spine:  Cervical spine: ROM is full in flexion, extension and lateral rotation with mild increased right neck pain during extension and right lateral rotation.  Spurling's maneuver causes no neck pain to either side.  Myofascial exam: No Tenderness to palpation across cervical paraspinous region bilaterally.    Lumbar spine:  Lumbar spine:  Range of motion not tested, unable to stand without assistance.  Myofascial exam: No tenderness to palpation across lumbar paraspinous muscles.    Imagin19 Xray Thoracolumbar spine:  Osseous demineralization is noted diffusely.  L1, L2, L3, and L4 posterior pedicle screw fusion is suspected with intervertebral disc prostheses at the L2-L3 and L3-L4 levels.  Intervertebral disc height loss is noted at all lumbar levels.  Vertebral body heights are suboptimally evaluated secondary to poor penetration on the lateral likely secondary to body habitus.  Atherosclerotic calcifications are noted within the aorta.       Assessment:   The patient is a 78-year-old man with a history of bladder CA status post cystectomy also requiring left leg amputation, chronic low back pain with multiple lumbar surgeries who presents in referral from [facility] for continued pain.    1. Cervical radiculopathy        2. Chronic pain syndrome  fentaNYL (DURAGESIC) 25 mcg/hr    fentaNYL (DURAGESIC) 25 mcg/hr    fentaNYL (DURAGESIC) 25 mcg/hr    oxyCODONE (ROXICODONE) 5 MG immediate release tablet    oxyCODONE (ROXICODONE) 5 MG immediate release tablet    oxyCODONE (ROXICODONE) 5 MG immediate release tablet      3. Chronic right shoulder pain        4. Opioid use agreement exists              Plan:  1.  We discussed that for his pain in the right shoulder, right arm this could be cervical radiculopathy versus carpal tunnel syndrome.  We need to get imaging of his cervical  spine, I guess this was done elsewhere but we do not have a copy of this.  I would like to see this to see if this would explain his right arm symptoms.  If so, consideration could be made for an epidural steroid injection.  2. In the meantime, he is using fentanyl patch but states that he has not been receiving oxycodone 5 mg, I think this would help and I have written this in his orders for the nursing home where he currently resides.  He states that he has not going to be able to go home unless he is able to transfer himself out of his wheelchair and at this time his right shoulder hurts too much for this.  We discussed that he had previously been on higher doses medication but I explained that due to his age and likely due to his metabolism that these medications may have a greater effect on him causing higher risk of adverse events.  He expresses understanding.  3. We will keep him on the schedule for a 2 to three-month follow-up as needed.

## 2024-05-23 ENCOUNTER — OFFICE VISIT (OUTPATIENT)
Dept: PAIN MEDICINE | Facility: CLINIC | Age: 79
End: 2024-05-23
Payer: MEDICARE

## 2024-05-23 VITALS
SYSTOLIC BLOOD PRESSURE: 139 MMHG | BODY MASS INDEX: 39.6 KG/M2 | WEIGHT: 282.88 LBS | DIASTOLIC BLOOD PRESSURE: 63 MMHG | HEIGHT: 71 IN | HEART RATE: 72 BPM

## 2024-05-23 DIAGNOSIS — M96.1 POSTLAMINECTOMY SYNDROME OF LUMBAR REGION: ICD-10-CM

## 2024-05-23 DIAGNOSIS — G89.29 CHRONIC RIGHT SHOULDER PAIN: ICD-10-CM

## 2024-05-23 DIAGNOSIS — Z79.891 OPIOID USE AGREEMENT EXISTS: ICD-10-CM

## 2024-05-23 DIAGNOSIS — M25.511 CHRONIC RIGHT SHOULDER PAIN: ICD-10-CM

## 2024-05-23 DIAGNOSIS — M54.12 CERVICAL RADICULOPATHY: ICD-10-CM

## 2024-05-23 DIAGNOSIS — G89.4 CHRONIC PAIN SYNDROME: Primary | ICD-10-CM

## 2024-05-23 DIAGNOSIS — G89.4 CHRONIC PAIN SYNDROME: ICD-10-CM

## 2024-05-23 DIAGNOSIS — G54.6 PHANTOM LIMB PAIN: ICD-10-CM

## 2024-05-23 PROCEDURE — 3078F DIAST BP <80 MM HG: CPT | Mod: CPTII,S$GLB,, | Performed by: PHYSICIAN ASSISTANT

## 2024-05-23 PROCEDURE — 1160F RVW MEDS BY RX/DR IN RCRD: CPT | Mod: CPTII,S$GLB,, | Performed by: PHYSICIAN ASSISTANT

## 2024-05-23 PROCEDURE — 1101F PT FALLS ASSESS-DOCD LE1/YR: CPT | Mod: CPTII,S$GLB,, | Performed by: PHYSICIAN ASSISTANT

## 2024-05-23 PROCEDURE — 1125F AMNT PAIN NOTED PAIN PRSNT: CPT | Mod: CPTII,S$GLB,, | Performed by: PHYSICIAN ASSISTANT

## 2024-05-23 PROCEDURE — 3288F FALL RISK ASSESSMENT DOCD: CPT | Mod: CPTII,S$GLB,, | Performed by: PHYSICIAN ASSISTANT

## 2024-05-23 PROCEDURE — 99214 OFFICE O/P EST MOD 30 MIN: CPT | Mod: S$GLB,,, | Performed by: PHYSICIAN ASSISTANT

## 2024-05-23 PROCEDURE — 1159F MED LIST DOCD IN RCRD: CPT | Mod: CPTII,S$GLB,, | Performed by: PHYSICIAN ASSISTANT

## 2024-05-23 PROCEDURE — 99999 PR PBB SHADOW E&M-EST. PATIENT-LVL IV: CPT | Mod: PBBFAC,,, | Performed by: PHYSICIAN ASSISTANT

## 2024-05-23 PROCEDURE — 3075F SYST BP GE 130 - 139MM HG: CPT | Mod: CPTII,S$GLB,, | Performed by: PHYSICIAN ASSISTANT

## 2024-05-23 RX ORDER — FENTANYL 25 UG/1
1 PATCH TRANSDERMAL
Qty: 10 PATCH | Refills: 0 | Status: SHIPPED | OUTPATIENT
Start: 2024-06-14 | End: 2024-07-14

## 2024-05-23 RX ORDER — FENTANYL 25 UG/1
1 PATCH TRANSDERMAL
Qty: 10 PATCH | Refills: 0 | Status: SHIPPED | OUTPATIENT
Start: 2024-07-14 | End: 2024-08-13

## 2024-05-23 RX ORDER — OXYCODONE HYDROCHLORIDE 5 MG/1
5 TABLET ORAL EVERY 8 HOURS PRN
Qty: 90 TABLET | Refills: 0 | Status: SHIPPED | OUTPATIENT
Start: 2024-06-22 | End: 2024-07-22

## 2024-05-23 RX ORDER — FENTANYL 25 UG/1
1 PATCH TRANSDERMAL
Qty: 10 PATCH | Refills: 0 | Status: SHIPPED | OUTPATIENT
Start: 2024-08-13 | End: 2024-09-12

## 2024-05-23 RX ORDER — OXYCODONE HYDROCHLORIDE 5 MG/1
5 TABLET ORAL EVERY 8 HOURS PRN
Qty: 90 TABLET | Refills: 0 | Status: SHIPPED | OUTPATIENT
Start: 2024-05-23 | End: 2024-06-22

## 2024-05-23 RX ORDER — OXYCODONE HYDROCHLORIDE 5 MG/1
5 TABLET ORAL EVERY 8 HOURS PRN
Qty: 90 TABLET | Refills: 0 | Status: SHIPPED | OUTPATIENT
Start: 2024-07-22 | End: 2024-08-21

## 2024-07-12 ENCOUNTER — TELEPHONE (OUTPATIENT)
Dept: PAIN MEDICINE | Facility: CLINIC | Age: 79
End: 2024-07-12
Payer: MEDICARE

## 2024-07-15 ENCOUNTER — TELEPHONE (OUTPATIENT)
Dept: PAIN MEDICINE | Facility: CLINIC | Age: 79
End: 2024-07-15
Payer: MEDICARE

## 2024-07-15 DIAGNOSIS — G89.4 CHRONIC PAIN SYNDROME: ICD-10-CM

## 2024-07-15 NOTE — TELEPHONE ENCOUNTER
----- Message from Santa Mendez sent at 7/15/2024 10:22 AM CDT -----  Regarding: Refill  Type:  RX Refill Request    Who Called: Ms. Grove /Home care    Refill or New Rx:Refill    RX Name and Strength:fentaNYL (DURAGESIC) 25 mcg/hr    Preferred Pharmacy with phone number:      [facility] - [place], [place] - [address] [id number] [address]  [facility] [address]  Phone: [phone] Fax: [phone]    Local or Mail Order:Local      Would the patient rather a call back or a response via [facility]? Call back    Best Call Back Number [phone]    Additional Information: Nurse is say meds was not at Rx and would liek a call back.Thank you

## 2024-09-02 PROBLEM — N18.32 ACUTE RENAL FAILURE SUPERIMPOSED ON STAGE 3B CHRONIC KIDNEY DISEASE: Status: ACTIVE | Noted: 2024-09-02

## 2024-09-02 PROBLEM — E87.20 METABOLIC ACIDOSIS: Status: ACTIVE | Noted: 2024-09-02

## 2024-09-02 PROBLEM — N13.2 HYDRONEPHROSIS WITH URINARY OBSTRUCTION DUE TO RENAL CALCULUS: Status: ACTIVE | Noted: 2024-09-02

## 2024-09-02 PROBLEM — R65.20 SEVERE SEPSIS: Status: ACTIVE | Noted: 2024-09-02

## 2024-09-02 PROBLEM — G93.41 ACUTE METABOLIC ENCEPHALOPATHY: Status: ACTIVE | Noted: 2024-09-02

## 2024-09-02 PROBLEM — A41.9 SEVERE SEPSIS: Status: ACTIVE | Noted: 2024-09-02

## 2024-09-02 PROBLEM — N17.9 ACUTE RENAL FAILURE SUPERIMPOSED ON STAGE 3B CHRONIC KIDNEY DISEASE: Status: ACTIVE | Noted: 2024-09-02

## 2024-09-02 PROBLEM — Z71.89 ACP (ADVANCE CARE PLANNING): Status: ACTIVE | Noted: 2024-09-02

## 2024-09-05 PROBLEM — E87.6 HYPOKALEMIA: Status: ACTIVE | Noted: 2024-09-05

## 2024-09-07 PROBLEM — R78.81 BACTEREMIA DUE TO PROTEUS SPECIES: Status: ACTIVE | Noted: 2024-09-07

## 2024-09-07 PROBLEM — B96.4 BACTEREMIA DUE TO PROTEUS SPECIES: Status: ACTIVE | Noted: 2024-09-07

## 2024-10-07 PROBLEM — I48.91 A-FIB: Status: ACTIVE | Noted: 2024-10-07

## 2024-10-10 PROBLEM — G93.41 ACUTE METABOLIC ENCEPHALOPATHY: Status: RESOLVED | Noted: 2024-09-02 | Resolved: 2024-10-10

## 2024-10-10 PROBLEM — N17.9 AKI (ACUTE KIDNEY INJURY): Status: RESOLVED | Noted: 2018-06-23 | Resolved: 2024-10-10

## 2024-10-11 PROBLEM — N30.00 ACUTE CYSTITIS WITHOUT HEMATURIA: Status: ACTIVE | Noted: 2024-10-11

## 2024-10-11 PROBLEM — A09 DIARRHEA OF INFECTIOUS ORIGIN: Status: ACTIVE | Noted: 2024-10-11

## 2024-10-12 PROBLEM — A04.72 CLOSTRIDIUM DIFFICILE COLITIS: Status: ACTIVE | Noted: 2024-10-12

## 2024-10-13 PROBLEM — D53.9 MACROCYTIC ANEMIA: Status: ACTIVE | Noted: 2024-10-13

## 2024-10-13 PROBLEM — B95.61 BACTEREMIA DUE TO METHICILLIN SUSCEPTIBLE STAPHYLOCOCCUS AUREUS (MSSA): Status: ACTIVE | Noted: 2024-09-07

## 2024-10-15 PROBLEM — L27.0 DRUG RASH: Status: ACTIVE | Noted: 2024-10-15

## 2024-11-01 ENCOUNTER — OFFICE VISIT (OUTPATIENT)
Dept: PAIN MEDICINE | Facility: CLINIC | Age: 79
End: 2024-11-01
Payer: MEDICARE

## 2024-11-01 VITALS
SYSTOLIC BLOOD PRESSURE: 109 MMHG | BODY MASS INDEX: 31.73 KG/M2 | DIASTOLIC BLOOD PRESSURE: 51 MMHG | WEIGHT: 227.5 LBS | HEART RATE: 60 BPM

## 2024-11-01 DIAGNOSIS — B95.61 BACTEREMIA DUE TO METHICILLIN SUSCEPTIBLE STAPHYLOCOCCUS AUREUS (MSSA): ICD-10-CM

## 2024-11-01 DIAGNOSIS — G89.4 CHRONIC PAIN SYNDROME: ICD-10-CM

## 2024-11-01 DIAGNOSIS — G89.4 CHRONIC PAIN SYNDROME: Primary | ICD-10-CM

## 2024-11-01 DIAGNOSIS — G54.6 PHANTOM LIMB PAIN: ICD-10-CM

## 2024-11-01 DIAGNOSIS — A09 DIARRHEA OF INFECTIOUS ORIGIN: ICD-10-CM

## 2024-11-01 DIAGNOSIS — Z79.891 OPIOID USE AGREEMENT EXISTS: ICD-10-CM

## 2024-11-01 DIAGNOSIS — R41.0 DELIRIUM: ICD-10-CM

## 2024-11-01 DIAGNOSIS — M96.1 POSTLAMINECTOMY SYNDROME OF LUMBAR REGION: ICD-10-CM

## 2024-11-01 DIAGNOSIS — R78.81 BACTEREMIA DUE TO METHICILLIN SUSCEPTIBLE STAPHYLOCOCCUS AUREUS (MSSA): ICD-10-CM

## 2024-11-01 PROCEDURE — 99999 PR PBB SHADOW E&M-EST. PATIENT-LVL III: CPT | Mod: PBBFAC,,,

## 2024-11-03 RX ORDER — FENTANYL 25 UG/1
1 PATCH TRANSDERMAL
Qty: 10 PATCH | Refills: 0 | Status: ON HOLD | OUTPATIENT
Start: 2024-11-03 | End: 2024-12-03

## 2024-11-03 RX ORDER — FENTANYL 25 UG/1
1 PATCH TRANSDERMAL
Qty: 10 PATCH | Refills: 0 | Status: ON HOLD | OUTPATIENT
Start: 2024-12-01 | End: 2024-12-31

## 2024-11-03 RX ORDER — FENTANYL 25 UG/1
1 PATCH TRANSDERMAL
Qty: 10 PATCH | Refills: 0 | Status: ON HOLD | OUTPATIENT
Start: 2024-12-31 | End: 2025-01-30

## 2024-11-05 PROBLEM — E66.811 OBESITY (BMI 30.0-34.9): Status: ACTIVE | Noted: 2018-06-23

## 2024-11-05 PROBLEM — N30.01 ACUTE CYSTITIS WITH HEMATURIA: Status: ACTIVE | Noted: 2024-10-11

## 2024-11-06 PROBLEM — Z43.6 ATTENTION TO NEPHROSTOMY: Status: ACTIVE | Noted: 2024-11-06

## 2024-11-06 PROBLEM — N18.9 ACUTE KIDNEY INJURY SUPERIMPOSED ON CKD: Status: ACTIVE | Noted: 2018-06-23

## 2024-11-06 PROBLEM — Z93.6 PRESENCE OF UROSTOMY: Status: ACTIVE | Noted: 2024-11-06

## 2024-11-06 PROBLEM — L98.9 SKIN LESIONS: Status: ACTIVE | Noted: 2024-11-06

## 2024-11-06 PROBLEM — Z22.322 POSITIVE RESULT FOR METHICILLIN RESISTANT STAPHYLOCOCCUS AUREUS (MRSA) SCREENING: Status: ACTIVE | Noted: 2024-11-06

## 2024-11-07 PROBLEM — E44.0 MODERATE MALNUTRITION: Status: ACTIVE | Noted: 2024-11-07
